# Patient Record
Sex: MALE | Race: BLACK OR AFRICAN AMERICAN | NOT HISPANIC OR LATINO | Employment: OTHER | URBAN - METROPOLITAN AREA
[De-identification: names, ages, dates, MRNs, and addresses within clinical notes are randomized per-mention and may not be internally consistent; named-entity substitution may affect disease eponyms.]

---

## 2024-03-14 ENCOUNTER — APPOINTMENT (OUTPATIENT)
Dept: RADIOLOGY | Facility: HOSPITAL | Age: 64
End: 2024-03-14
Payer: COMMERCIAL

## 2024-03-14 ENCOUNTER — HOSPITAL ENCOUNTER (EMERGENCY)
Facility: HOSPITAL | Age: 64
Discharge: HOME | End: 2024-03-14
Attending: EMERGENCY MEDICINE
Payer: COMMERCIAL

## 2024-03-14 VITALS
DIASTOLIC BLOOD PRESSURE: 114 MMHG | RESPIRATION RATE: 16 BRPM | SYSTOLIC BLOOD PRESSURE: 180 MMHG | HEART RATE: 72 BPM | OXYGEN SATURATION: 97 % | TEMPERATURE: 97.2 F

## 2024-03-14 DIAGNOSIS — I10 ESSENTIAL HYPERTENSION: Primary | ICD-10-CM

## 2024-03-14 DIAGNOSIS — J02.9 SORE THROAT: ICD-10-CM

## 2024-03-14 LAB
ALBUMIN SERPL BCP-MCNC: 4 G/DL (ref 3.4–5)
ALP SERPL-CCNC: 84 U/L (ref 33–136)
ALT SERPL W P-5'-P-CCNC: 13 U/L (ref 10–52)
ANION GAP SERPL CALC-SCNC: 11 MMOL/L (ref 10–20)
AST SERPL W P-5'-P-CCNC: 19 U/L (ref 9–39)
BASOPHILS # BLD AUTO: 0.04 X10*3/UL (ref 0–0.1)
BASOPHILS NFR BLD AUTO: 0.8 %
BILIRUB SERPL-MCNC: 0.7 MG/DL (ref 0–1.2)
BUN SERPL-MCNC: 14 MG/DL (ref 6–23)
CALCIUM SERPL-MCNC: 9.4 MG/DL (ref 8.6–10.6)
CARDIAC TROPONIN I PNL SERPL HS: 51 NG/L (ref 0–53)
CARDIAC TROPONIN I PNL SERPL HS: 63 NG/L (ref 0–53)
CHLORIDE SERPL-SCNC: 104 MMOL/L (ref 98–107)
CO2 SERPL-SCNC: 26 MMOL/L (ref 21–32)
CREAT SERPL-MCNC: 1.07 MG/DL (ref 0.5–1.3)
EGFRCR SERPLBLD CKD-EPI 2021: 78 ML/MIN/1.73M*2
EOSINOPHIL # BLD AUTO: 0.13 X10*3/UL (ref 0–0.7)
EOSINOPHIL NFR BLD AUTO: 2.7 %
ERYTHROCYTE [DISTWIDTH] IN BLOOD BY AUTOMATED COUNT: 11.5 % (ref 11.5–14.5)
GLUCOSE SERPL-MCNC: 109 MG/DL (ref 74–99)
HCT VFR BLD AUTO: 41.6 % (ref 41–52)
HGB BLD-MCNC: 14.8 G/DL (ref 13.5–17.5)
IMM GRANULOCYTES # BLD AUTO: 0.01 X10*3/UL (ref 0–0.7)
IMM GRANULOCYTES NFR BLD AUTO: 0.2 % (ref 0–0.9)
LYMPHOCYTES # BLD AUTO: 1.78 X10*3/UL (ref 1.2–4.8)
LYMPHOCYTES NFR BLD AUTO: 37.1 %
MAGNESIUM SERPL-MCNC: 1.94 MG/DL (ref 1.6–2.4)
MCH RBC QN AUTO: 30.8 PG (ref 26–34)
MCHC RBC AUTO-ENTMCNC: 35.6 G/DL (ref 32–36)
MCV RBC AUTO: 87 FL (ref 80–100)
MONOCYTES # BLD AUTO: 0.42 X10*3/UL (ref 0.1–1)
MONOCYTES NFR BLD AUTO: 8.8 %
NEUTROPHILS # BLD AUTO: 2.42 X10*3/UL (ref 1.2–7.7)
NEUTROPHILS NFR BLD AUTO: 50.4 %
NRBC BLD-RTO: 0 /100 WBCS (ref 0–0)
PLATELET # BLD AUTO: 182 X10*3/UL (ref 150–450)
POTASSIUM SERPL-SCNC: 4.3 MMOL/L (ref 3.5–5.3)
PROT SERPL-MCNC: 6.8 G/DL (ref 6.4–8.2)
RBC # BLD AUTO: 4.8 X10*6/UL (ref 4.5–5.9)
S PYO DNA THROAT QL NAA+PROBE: NOT DETECTED
SODIUM SERPL-SCNC: 137 MMOL/L (ref 136–145)
WBC # BLD AUTO: 4.8 X10*3/UL (ref 4.4–11.3)

## 2024-03-14 PROCEDURE — 84484 ASSAY OF TROPONIN QUANT: CPT | Performed by: STUDENT IN AN ORGANIZED HEALTH CARE EDUCATION/TRAINING PROGRAM

## 2024-03-14 PROCEDURE — 71045 X-RAY EXAM CHEST 1 VIEW: CPT | Performed by: STUDENT IN AN ORGANIZED HEALTH CARE EDUCATION/TRAINING PROGRAM

## 2024-03-14 PROCEDURE — 99285 EMERGENCY DEPT VISIT HI MDM: CPT | Performed by: EMERGENCY MEDICINE

## 2024-03-14 PROCEDURE — 87651 STREP A DNA AMP PROBE: CPT | Performed by: EMERGENCY MEDICINE

## 2024-03-14 PROCEDURE — 83735 ASSAY OF MAGNESIUM: CPT | Performed by: STUDENT IN AN ORGANIZED HEALTH CARE EDUCATION/TRAINING PROGRAM

## 2024-03-14 PROCEDURE — 36415 COLL VENOUS BLD VENIPUNCTURE: CPT | Performed by: STUDENT IN AN ORGANIZED HEALTH CARE EDUCATION/TRAINING PROGRAM

## 2024-03-14 PROCEDURE — 85025 COMPLETE CBC W/AUTO DIFF WBC: CPT | Performed by: STUDENT IN AN ORGANIZED HEALTH CARE EDUCATION/TRAINING PROGRAM

## 2024-03-14 PROCEDURE — 99283 EMERGENCY DEPT VISIT LOW MDM: CPT

## 2024-03-14 PROCEDURE — 80053 COMPREHEN METABOLIC PANEL: CPT | Performed by: STUDENT IN AN ORGANIZED HEALTH CARE EDUCATION/TRAINING PROGRAM

## 2024-03-14 PROCEDURE — 71045 X-RAY EXAM CHEST 1 VIEW: CPT

## 2024-03-14 ASSESSMENT — LIFESTYLE VARIABLES
HAVE PEOPLE ANNOYED YOU BY CRITICIZING YOUR DRINKING: NO
EVER FELT BAD OR GUILTY ABOUT YOUR DRINKING: NO
EVER HAD A DRINK FIRST THING IN THE MORNING TO STEADY YOUR NERVES TO GET RID OF A HANGOVER: NO
HAVE YOU EVER FELT YOU SHOULD CUT DOWN ON YOUR DRINKING: NO

## 2024-03-14 ASSESSMENT — COLUMBIA-SUICIDE SEVERITY RATING SCALE - C-SSRS
1. IN THE PAST MONTH, HAVE YOU WISHED YOU WERE DEAD OR WISHED YOU COULD GO TO SLEEP AND NOT WAKE UP?: NO
6. HAVE YOU EVER DONE ANYTHING, STARTED TO DO ANYTHING, OR PREPARED TO DO ANYTHING TO END YOUR LIFE?: NO
2. HAVE YOU ACTUALLY HAD ANY THOUGHTS OF KILLING YOURSELF?: NO

## 2024-03-14 ASSESSMENT — PAIN DESCRIPTION - PROGRESSION: CLINICAL_PROGRESSION: NOT CHANGED

## 2024-03-14 ASSESSMENT — PAIN - FUNCTIONAL ASSESSMENT: PAIN_FUNCTIONAL_ASSESSMENT: 0-10

## 2024-03-14 ASSESSMENT — PAIN SCALES - GENERAL: PAINLEVEL_OUTOF10: 0 - NO PAIN

## 2024-03-14 NOTE — DISCHARGE INSTRUCTIONS
You came to the emergency department with high blood pressure.  Please discuss with your primary care doctor about increasing your medications potentially to help control your blood pressure more.  High blood pressure can lead to worsening of the problems of the aorta or the arteries in your neck.  Please return to the ER if you have severe headache, you develop chest or severe abdominal pain, have numbness, tingling, weakness to the arms or legs or any new or concerning symptoms.    Follow-up your strep throat result in your chart.

## 2024-03-14 NOTE — ED TRIAGE NOTES
Pt presents to ED via EMS with c/c HTN/dizziness. Pt reports hx HTN, complaint with meds, AAA. Says while he was watching TV today he developed dizziness, feeling like he had to have a BM. Checked BP repeatedly with home cuff. Per EMS on their arrival BP 210s/140s. Pt took dose of Clonidine. Per EMS pt reports hx PTSD/anxiety, pt reported to EMS that he believes his 'crackhead neighbor' could be poisoning him.

## 2024-03-14 NOTE — ED PROVIDER NOTES
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  Patient states ever since his surgery he develops pressure in his lower abdomen about once daily when he has a bowel movement.  He states when he has a bowel movement, his blood pressure will usually spike, and he needs to take an as needed clonidine to break it down.  Reports normal blood pressures in the 150s systolic, but notes his blood pressure can spike as high as the 270s at home.  He usually becomes dizzy which he describes as room spinning with these episodes as well, but notes they resolve after his blood pressure.    Patient had an episode of symptoms today which she says was no different than his usual symptoms, however his blood pressure was too high to read on his home machine.  He did take a home clonidine.  Initial blood pressure too high to read for EMS, but was in the 210s on reevaluation.    Patient is very reluctant to have extensive workup in the emergency department, states he is seen very frequently.  He is adamant that he does not want a repeat CT scan of his chest abdomen pelvis as he does not like how the contrast feels, although he does recognize the risks of not performing repeat imaging.  He has no chest pain or abdominal pain currently, no dizziness currently.  States he is asymptomatic now.    Records Reviewed:  Recent available ED and inpatient notes reviewed in EMR.    PMHx/PSHx:  Per HPI.   - has a past medical history of Anxiety disorder, unspecified, Diverticulitis of intestine, part unspecified, without perforation or abscess without bleeding, Other allergy status, other than to drugs and biological substances, Other specified  health status, Personal history of other diseases of the circulatory system, and Personal history of other diseases of the circulatory system.  - has a past surgical history that includes Other surgical history (06/29/2020); Incision and drainage of wound (10/26/2016); CT angio abdomen pelvis w and or wo IV IV contrast (2/11/2018); CT angio abdomen pelvis w and or wo IV IV contrast (5/8/2018); CT angio abdomen pelvis w and or wo IV IV contrast (2/12/2019); CT angio abdomen pelvis w and or wo IV IV contrast (2/25/2019); CT angio neck (9/8/2020); CT angio abdomen pelvis w and or wo IV IV contrast (9/10/2020); CT angio abdomen pelvis w and or wo IV IV contrast (2/9/2022); CT angio abdomen pelvis w and or wo IV IV contrast (4/19/2017); CT angio abdomen pelvis w and or wo IV IV contrast (4/23/2017); CT angio abdomen pelvis w and or wo IV IV contrast (6/2/2017); CT angio abdomen pelvis w and or wo IV IV contrast (9/25/2017); CT angio abdomen pelvis w and or wo IV IV contrast (12/25/2017); CT angio abdomen pelvis w and or wo IV IV contrast (12/28/2017); CT angio abdomen pelvis w and or wo IV IV contrast (7/11/2018); CT angio coronary art with heartflow if score >30% (7/11/2018); CT angio head w and wo IV contrast (1/20/2023); CT angio neck (1/20/2023); CT angio abdomen pelvis w and or wo IV IV contrast (12/1/2022); and CT angio abdomen pelvis w and or wo IV IV contrast (12/4/2022).  - does not have a problem list on file.    Medications:  Reviewed in EMR. See EMR for complete list of medications and doses.    Allergies:  Ibuprofen and Carvedilol    Social History:  - Tobacco:  has no history on file for tobacco use.   - Alcohol:  has no history on file for alcohol use.   - Illicit Drugs:  has no history on file for drug use.     ROS:  Per HPI.       ???????????????????????????????????????????????????????????????  Triage Vitals:  T 36.2 °C (97.2 °F)  HR 88  BP (!) 208/128  RR 16  O2 99 %      Physical  Exam  Constitutional:       Appearance: Normal appearance.   HENT:      Head: Normocephalic and atraumatic.      Mouth/Throat:      Mouth: Mucous membranes are moist.   Eyes:      Conjunctiva/sclera: Conjunctivae normal.   Cardiovascular:      Rate and Rhythm: Normal rate and regular rhythm.      Heart sounds: Normal heart sounds.   Pulmonary:      Breath sounds: Normal breath sounds. No wheezing or rales.   Abdominal:      General: There is no distension.      Palpations: Abdomen is soft.      Tenderness: There is no abdominal tenderness.   Musculoskeletal:      Right lower leg: No edema.      Left lower leg: No edema.   Skin:     General: Skin is warm and dry.   Neurological:      General: No focal deficit present.      Mental Status: He is alert.   Psychiatric:         Mood and Affect: Mood normal.         Behavior: Behavior normal.       ???????????????????????????????????????????????????????????????  EKG:  3/14/2024 1448 -- NSR rate 82.  Normal axis.  Normal intervals.  Nonspecific T wave inversions in the inferior leads appears similar to prior EKG dated 8/25/2023.  No new ST-T changes or signs of acute ischemia.  Abnormal EKG.    Assessment and Plan:  63-year-old male with history as above presents to the emergency department with concern for elevated blood pressure, dizziness, and lower abdominal pain, now resolved.  Presentation is concerning for asymptomatic hypertension versus worsening of known aortic dissection flap or endoleak.  Blood pressure in the 210s on arrival, improved to 170s on reevaluation.  I had an extensive discussion with the patient at bedside about workup and recommendations.  He is agreeable to blood work and chest x-ray to rule out ACS or other acute cardiopulmonary etiologies, but is adamant that he does not want CT imaging.  In addition to his aforementioned aortic etiologies, he does have a history of vertebral artery dissection as well.  He describes periods of numbness over the  last few months, scattered and random in onset and duration, and all self-resolving.  Concerning for potential TIAs or worsening of vertebral artery dissection.    Patient understands the risks of these going untreated.  He has had many contrasted CT scans in the past, and states he does not want to pursue additional CT imaging at this time.  He understands the risk for undiagnosed TIAs or strokes, undiagnosed or worsening aortic or vertebral artery dissection, and other acute vascular pathology that cannot be ruled out without additional CT imaging.    Patient also expresses some paranoid thoughts.  States he does not trust his neighbor upstairs, and is worried that his neighbor may hurt him or his dog.  The patient does not perseverate on these ideas, however.  Does have a history of PTSD and anxiety, not currently on medication.  He states his nephew is going to get a security camera for his apartment.  I think this is reasonable.  Does not appear to have acute psychiatric decompensation.  Do not feel that he needs emergent psychiatric evaluation.    ED Course:  CXR without acute abnormality.  Cardiac workup demonstrates a mild troponin elevation which is around the patient's baseline, is down-trended on repeat.  EKG unchanged from prior.  Blood pressure continues to be improved to the 180s on reevaluation.  Patient continues to be asymptomatic.  Will be discharged home.  Patient tells me he will follow-up with his neurologist, primary care doctor, and cardiothoracic surgeon as outpatient.    Social Determinants Limiting Care:  None identified    Disposition:  Discharge home    --  Cristy Saavedra MD  Emergency Medicine, PGY-3      Diagnoses as of 03/14/24 1945   Essential hypertension   Sore throat     Procedures ? [employer] last updated 3/14/2024 2:39 PM         Cristy Saavedra MD  Resident  03/14/24 1952

## 2024-03-16 LAB
ATRIAL RATE: 82 BPM
P AXIS: 66 DEGREES
P OFFSET: 164 MS
P ONSET: 105 MS
PR INTERVAL: 208 MS
Q ONSET: 209 MS
QRS COUNT: 13 BEATS
QRS DURATION: 102 MS
QT INTERVAL: 350 MS
QTC CALCULATION(BAZETT): 408 MS
QTC FREDERICIA: 388 MS
R AXIS: 32 DEGREES
T AXIS: 15 DEGREES
T OFFSET: 384 MS
VENTRICULAR RATE: 82 BPM

## 2024-07-06 ENCOUNTER — HOSPITAL ENCOUNTER (EMERGENCY)
Facility: HOSPITAL | Age: 64
Discharge: HOME | End: 2024-07-06
Attending: STUDENT IN AN ORGANIZED HEALTH CARE EDUCATION/TRAINING PROGRAM
Payer: COMMERCIAL

## 2024-07-06 ENCOUNTER — APPOINTMENT (OUTPATIENT)
Dept: RADIOLOGY | Facility: HOSPITAL | Age: 64
End: 2024-07-06
Payer: COMMERCIAL

## 2024-07-06 ENCOUNTER — CLINICAL SUPPORT (OUTPATIENT)
Dept: EMERGENCY MEDICINE | Facility: HOSPITAL | Age: 64
End: 2024-07-06
Payer: COMMERCIAL

## 2024-07-06 VITALS
SYSTOLIC BLOOD PRESSURE: 192 MMHG | OXYGEN SATURATION: 96 % | HEART RATE: 73 BPM | DIASTOLIC BLOOD PRESSURE: 132 MMHG | WEIGHT: 211 LBS | TEMPERATURE: 98.4 F | BODY MASS INDEX: 28.58 KG/M2 | HEIGHT: 72 IN | RESPIRATION RATE: 16 BRPM

## 2024-07-06 DIAGNOSIS — R42 LIGHTHEADEDNESS: Primary | ICD-10-CM

## 2024-07-06 LAB
ALBUMIN SERPL BCP-MCNC: 3.9 G/DL (ref 3.4–5)
ALP SERPL-CCNC: 77 U/L (ref 33–136)
ALT SERPL W P-5'-P-CCNC: 12 U/L (ref 10–52)
ANION GAP SERPL CALC-SCNC: 11 MMOL/L (ref 10–20)
AST SERPL W P-5'-P-CCNC: 16 U/L (ref 9–39)
ATRIAL RATE: 75 BPM
BASOPHILS # BLD AUTO: 0.05 X10*3/UL (ref 0–0.1)
BASOPHILS NFR BLD AUTO: 1 %
BILIRUB SERPL-MCNC: 0.9 MG/DL (ref 0–1.2)
BUN SERPL-MCNC: 11 MG/DL (ref 6–23)
CALCIUM SERPL-MCNC: 9.1 MG/DL (ref 8.6–10.6)
CARDIAC TROPONIN I PNL SERPL HS: 64 NG/L (ref 0–53)
CARDIAC TROPONIN I PNL SERPL HS: 69 NG/L (ref 0–53)
CHLORIDE SERPL-SCNC: 105 MMOL/L (ref 98–107)
CO2 SERPL-SCNC: 26 MMOL/L (ref 21–32)
CREAT SERPL-MCNC: 0.94 MG/DL (ref 0.5–1.3)
EGFRCR SERPLBLD CKD-EPI 2021: >90 ML/MIN/1.73M*2
EOSINOPHIL # BLD AUTO: 0.16 X10*3/UL (ref 0–0.7)
EOSINOPHIL NFR BLD AUTO: 3.3 %
ERYTHROCYTE [DISTWIDTH] IN BLOOD BY AUTOMATED COUNT: 11.8 % (ref 11.5–14.5)
GLUCOSE SERPL-MCNC: 149 MG/DL (ref 74–99)
HCT VFR BLD AUTO: 39.4 % (ref 41–52)
HGB BLD-MCNC: 14.4 G/DL (ref 13.5–17.5)
IMM GRANULOCYTES # BLD AUTO: 0.01 X10*3/UL (ref 0–0.7)
IMM GRANULOCYTES NFR BLD AUTO: 0.2 % (ref 0–0.9)
LACTATE SERPL-SCNC: 1.7 MMOL/L (ref 0.4–2)
LYMPHOCYTES # BLD AUTO: 1.54 X10*3/UL (ref 1.2–4.8)
LYMPHOCYTES NFR BLD AUTO: 31.6 %
MCH RBC QN AUTO: 32.3 PG (ref 26–34)
MCHC RBC AUTO-ENTMCNC: 36.5 G/DL (ref 32–36)
MCV RBC AUTO: 88 FL (ref 80–100)
MONOCYTES # BLD AUTO: 0.41 X10*3/UL (ref 0.1–1)
MONOCYTES NFR BLD AUTO: 8.4 %
NEUTROPHILS # BLD AUTO: 2.71 X10*3/UL (ref 1.2–7.7)
NEUTROPHILS NFR BLD AUTO: 55.5 %
NRBC BLD-RTO: 0 /100 WBCS (ref 0–0)
P AXIS: 43 DEGREES
P OFFSET: 168 MS
P ONSET: 112 MS
PLATELET # BLD AUTO: 175 X10*3/UL (ref 150–450)
POTASSIUM SERPL-SCNC: 3.7 MMOL/L (ref 3.5–5.3)
PR INTERVAL: 224 MS
PROT SERPL-MCNC: 6.9 G/DL (ref 6.4–8.2)
Q ONSET: 224 MS
QRS COUNT: 13 BEATS
QRS DURATION: 88 MS
QT INTERVAL: 358 MS
QTC CALCULATION(BAZETT): 399 MS
QTC FREDERICIA: 385 MS
R AXIS: 19 DEGREES
RBC # BLD AUTO: 4.46 X10*6/UL (ref 4.5–5.9)
SODIUM SERPL-SCNC: 138 MMOL/L (ref 136–145)
T AXIS: 43 DEGREES
T OFFSET: 403 MS
VENTRICULAR RATE: 75 BPM
WBC # BLD AUTO: 4.9 X10*3/UL (ref 4.4–11.3)

## 2024-07-06 PROCEDURE — 84484 ASSAY OF TROPONIN QUANT: CPT

## 2024-07-06 PROCEDURE — 80053 COMPREHEN METABOLIC PANEL: CPT

## 2024-07-06 PROCEDURE — 93005 ELECTROCARDIOGRAM TRACING: CPT

## 2024-07-06 PROCEDURE — 36415 COLL VENOUS BLD VENIPUNCTURE: CPT

## 2024-07-06 PROCEDURE — 85025 COMPLETE CBC W/AUTO DIFF WBC: CPT

## 2024-07-06 PROCEDURE — 71046 X-RAY EXAM CHEST 2 VIEWS: CPT

## 2024-07-06 PROCEDURE — 71046 X-RAY EXAM CHEST 2 VIEWS: CPT | Mod: FOREIGN READ | Performed by: RADIOLOGY

## 2024-07-06 PROCEDURE — 83605 ASSAY OF LACTIC ACID: CPT

## 2024-07-06 PROCEDURE — 99283 EMERGENCY DEPT VISIT LOW MDM: CPT | Mod: 25

## 2024-07-06 RX ORDER — HYDROXYZINE HYDROCHLORIDE 25 MG/1
25 TABLET, FILM COATED ORAL EVERY 6 HOURS
Qty: 12 TABLET | Refills: 0 | Status: SHIPPED | OUTPATIENT
Start: 2024-07-06 | End: 2024-07-09

## 2024-07-06 RX ORDER — FAMOTIDINE 20 MG/1
20 TABLET, FILM COATED ORAL 2 TIMES DAILY
Qty: 30 TABLET | Refills: 0 | Status: SHIPPED | OUTPATIENT
Start: 2024-07-06 | End: 2024-07-21

## 2024-07-06 ASSESSMENT — LIFESTYLE VARIABLES
HAVE PEOPLE ANNOYED YOU BY CRITICIZING YOUR DRINKING: NO
EVER FELT BAD OR GUILTY ABOUT YOUR DRINKING: NO
TOTAL SCORE: 0
HAVE YOU EVER FELT YOU SHOULD CUT DOWN ON YOUR DRINKING: NO
EVER HAD A DRINK FIRST THING IN THE MORNING TO STEADY YOUR NERVES TO GET RID OF A HANGOVER: NO

## 2024-07-06 ASSESSMENT — COLUMBIA-SUICIDE SEVERITY RATING SCALE - C-SSRS
2. HAVE YOU ACTUALLY HAD ANY THOUGHTS OF KILLING YOURSELF?: NO
1. IN THE PAST MONTH, HAVE YOU WISHED YOU WERE DEAD OR WISHED YOU COULD GO TO SLEEP AND NOT WAKE UP?: NO
6. HAVE YOU EVER DONE ANYTHING, STARTED TO DO ANYTHING, OR PREPARED TO DO ANYTHING TO END YOUR LIFE?: NO

## 2024-07-06 NOTE — DISCHARGE INSTRUCTIONS
Please take your medications as prescribed.  If you develop worsening chest pain, passing out, severe back pain, shortness of breath, dizziness please return to the ED.  Please follow-up with your primary care physician to discuss your ED visit.

## 2024-07-06 NOTE — ED PROVIDER NOTES
CC: Dizziness     History provided by: Patient  Limitations to History: None    HPI:  Patient is a 63-year-old male with history of hypertension, type B aortic dissection in 2017 status post stenting with chronic complication of dissection flap and aneurysmal dilation who presents to the emergency department with elevated blood pressure and dizziness.  Patient states nonspinning dizziness began after he went to use the bathroom earlier today.  He states it was after he completed pain.  He denied any associated chest pain, shortness of breath.  He states he feels nauseous but it is improving, denies any episodes of vomiting.  He denies any abdominal pain.  He states he is anxious because he has a history of dissection.  He denies any vision changes, neck pain, mental status changes, slurred speech.  He states he took his home clonidine before arriving to ED and just took 100 mg of his 200 mg normal dose of labetalol.  I discussed extensively with patient possible need for CT scan and he is hesitant and currently declines CT imaging because of prior experiences.  He states in the past he could not pee for over a week after getting contrast and it makes him feel ill but denies any overt allergy.  He states he has gotten many contrast scans and does not want one at this time.    I reviewed discharge summary from August 2023 when patient was discharged from neurology service for dizziness and was diagnosed with left vertebral artery dissection secondary to uncontrolled hypertension and possible posterior circulation stroke.  At this time patient was started on aspirin.    External Records Reviewed:  I reviewed prior ED visits, Care Everywhere, discharge summaries and outpatient records as appropriate.   ???????????????????????????????????????????????????????????????  Triage Vitals:  T    HR 87  BP (!) 205/140  RR 20  O2 99 % None (Room air)    Physical Exam  Vitals and nursing note reviewed.   Constitutional:        General: He is not in acute distress.     Appearance: Normal appearance.   HENT:      Head: Normocephalic and atraumatic.   Eyes:      Conjunctiva/sclera: Conjunctivae normal.   Cardiovascular:      Rate and Rhythm: Normal rate and regular rhythm.      Pulses: Normal pulses.      Comments: No chest wall tenderness to palpation  Pulmonary:      Effort: Pulmonary effort is normal. No respiratory distress.      Breath sounds: Normal breath sounds.   Abdominal:      General: Abdomen is flat.      Palpations: Abdomen is soft.      Comments: No abdominal tenderness palpation, no palpable masses, no rebound, no guarding   Musculoskeletal:         General: Normal range of motion.      Cervical back: Normal range of motion and neck supple.   Skin:     General: Skin is warm and dry.   Neurological:      General: No focal deficit present.      Mental Status: He is alert and oriented to person, place, and time. Mental status is at baseline.      Comments: Normal finger-nose testing bilaterally, 2+ radial pulses and DP bilaterally, ambulatory with steady gait, cranial nerves II to XII grossly intact, alert and oriented x 3, NIH 0, VAN negative, no appreciable nystagmus, extraocular movements intact   Psychiatric:         Mood and Affect: Mood normal.         Behavior: Behavior normal.        ???????????????????????????????????????????????????????????????  ED Course/Treatment/Medical Decision Making  MDM:  Patient is a 63-year-old male who presents with dizziness.  Vital signs notable for hypertension otherwise, hemodynamically stable.  Differential includes orthostatic hypotension, symptomatic anemia, near syncope, TIA, seizure, arrhythmia, valvular abnormality, hypoglycemia, electrolyte abnormality, drug effect, infection, aortic dissection.  Patient has equal pulses in all extremities, no palpable abdominal mass, abdominal tenderness, tearing chest pain to his back therefore lower suspicion for overt dissection.  Will monitor  blood pressure and control heart rate and BP.  He has no focal neurologic deficits as well on my examination.  He notes improvement in lightheadedness since initial presentation.  Patient given Zofran as well.  EKG, chest x-ray and basic labs obtained.  Please see H&P for patient's refusal of CT scanning, he understands the risks of not obtaining CT pending including missing another dissection and other acute pathology which could lead to death or critical illness.  Point-of-care ultrasound done as well.      ED Course:  ED Course as of 07/06/24 1317   Sat Jul 06, 2024   0700 Ambulatory in ED with steady gait [SA]   0751 She reviewed sinus rhythm rate 75, prolonged ME interval of 224 ms, QRS 88 ms, QTc 399 ms, no acute ST segment elevations or depressions however new T wave inversion seen when compared to prior from March 2024 in leads V4, V5, V6 however has been seen on prior such as June 2023 [SA]   0900 Chest x-ray reviewed without acute cardiopulmonary abnormality, no cardiomegaly noted [SA]   0900 Initial troponin elevated at 69, patient denies chest pain, delta troponin obtained [SA]   0900 CBC, metabolic panel and lactate overall within normal limits [SA]   1100 Troponin 64 on repeat, stable.  Elevated troponin likely in the setting of chronic hypertension, patient continues to deny any chest pain [SA]   1204 BP improved [SA]   1317 Point-of-care ultrasound of aorta attempted, bowel gas impeding views however no obvious aortic abnormalities [SA]   1317 Patient would like to be discharged with famotidine and Atarax or hydroxyzine which was provided [SA]   1317 I once again discussed importance of CT scan for follow-up and patient is declining and understands the risks [SA]      ED Course User Index  [SA] Radha Coulter DO         Diagnoses as of 07/06/24 1317   Lightheadedness           EKG Interpretation:  See ED Course/Below:    Independent Interpretation of Studies:  I independently interpreted  labs/imaging as stated in ED Course or below.    Differential diagnoses considered include but are not limited to: See MDM/Below:    Social Determinants Limiting Care:  None identified The following actions were taken to address these social determinants: None    Discussion of Management with Other Providers: See MDM/Below:    Disposition:  Discussed differential and workup results including pertinent labs/imaging and any incidental findings if applicable. Patient will follow-up with the primary physician in the next 2-3 days. Return if worse. They understand return precautions and discharge instructions. Patient and family/friend/caregiver are in agreement with this plan.       DAVIS Mcclain, PGY-3    I reviewed the case with the attending ED physician. The attending ED physician agrees with the plan. Patient and/or patient´s representative was counseled regarding labs, imaging, likely diagnosis, and plan. All questions were answered.    Disclaimer: This note was dictated by speech recognition.  Attempt at proofreading was made to minimize errors.  Errors in transcription may be present.  Please call if questions.    Procedures ? [employer] last updated 7/6/2024 1:17 PM        Radha Coulter DO  Resident  07/06/24 0509

## 2024-07-06 NOTE — ED TRIAGE NOTES
"Patient presents via EMS. Per patient, he got up to go \"number one\" felt fine and went back to bed, then became light headed so he sat up and immediately became very dizzy. He states he then panicked and ran out of his room, got his cell phone, called [facility]-1-1 and took care of his dog. Pt attempted to lay back down the it made the dizziness worse. Upon arrival to the ER, patient was anxious and unable to sit down stating he needed to have a bowel movement but was afraid because he has had an abdominal dissection and always gets nervous when their is pressure in his stomach. Patient was escorted to the bathroom, had a bowel movement and stated he felt much better. Patient admits he has some PTSD and asked the bed rails stay down the room door stay open.   "

## 2024-10-16 ENCOUNTER — HOSPITAL ENCOUNTER (EMERGENCY)
Facility: HOSPITAL | Age: 64
Discharge: HOME | End: 2024-10-16
Attending: EMERGENCY MEDICINE
Payer: COMMERCIAL

## 2024-10-16 VITALS
DIASTOLIC BLOOD PRESSURE: 104 MMHG | SYSTOLIC BLOOD PRESSURE: 147 MMHG | OXYGEN SATURATION: 96 % | HEART RATE: 83 BPM | TEMPERATURE: 98.3 F | RESPIRATION RATE: 14 BRPM

## 2024-10-16 DIAGNOSIS — R79.89 ELEVATED TROPONIN: ICD-10-CM

## 2024-10-16 DIAGNOSIS — I10 HYPERTENSION, UNSPECIFIED TYPE: Primary | ICD-10-CM

## 2024-10-16 LAB
ALBUMIN SERPL BCP-MCNC: 4.1 G/DL (ref 3.4–5)
ALP SERPL-CCNC: 89 U/L (ref 33–136)
ALT SERPL W P-5'-P-CCNC: 12 U/L (ref 10–52)
ANION GAP SERPL CALC-SCNC: 12 MMOL/L (ref 10–20)
AST SERPL W P-5'-P-CCNC: 19 U/L (ref 9–39)
BASOPHILS # BLD AUTO: 0.04 X10*3/UL (ref 0–0.1)
BASOPHILS NFR BLD AUTO: 0.8 %
BILIRUB SERPL-MCNC: 0.5 MG/DL (ref 0–1.2)
BUN SERPL-MCNC: 15 MG/DL (ref 6–23)
CALCIUM SERPL-MCNC: 9.2 MG/DL (ref 8.6–10.6)
CARDIAC TROPONIN I PNL SERPL HS: 77 NG/L (ref 0–53)
CARDIAC TROPONIN I PNL SERPL HS: 88 NG/L (ref 0–53)
CHLORIDE SERPL-SCNC: 103 MMOL/L (ref 98–107)
CO2 SERPL-SCNC: 28 MMOL/L (ref 21–32)
CREAT SERPL-MCNC: 0.93 MG/DL (ref 0.5–1.3)
EGFRCR SERPLBLD CKD-EPI 2021: >90 ML/MIN/1.73M*2
EOSINOPHIL # BLD AUTO: 0.16 X10*3/UL (ref 0–0.7)
EOSINOPHIL NFR BLD AUTO: 3.1 %
ERYTHROCYTE [DISTWIDTH] IN BLOOD BY AUTOMATED COUNT: 11.7 % (ref 11.5–14.5)
GLUCOSE SERPL-MCNC: 140 MG/DL (ref 74–99)
HCT VFR BLD AUTO: 42.4 % (ref 41–52)
HGB BLD-MCNC: 14.5 G/DL (ref 13.5–17.5)
IMM GRANULOCYTES # BLD AUTO: 0.02 X10*3/UL (ref 0–0.7)
IMM GRANULOCYTES NFR BLD AUTO: 0.4 % (ref 0–0.9)
LYMPHOCYTES # BLD AUTO: 1.78 X10*3/UL (ref 1.2–4.8)
LYMPHOCYTES NFR BLD AUTO: 34.7 %
MCH RBC QN AUTO: 31.5 PG (ref 26–34)
MCHC RBC AUTO-ENTMCNC: 34.2 G/DL (ref 32–36)
MCV RBC AUTO: 92 FL (ref 80–100)
MONOCYTES # BLD AUTO: 0.48 X10*3/UL (ref 0.1–1)
MONOCYTES NFR BLD AUTO: 9.4 %
NEUTROPHILS # BLD AUTO: 2.65 X10*3/UL (ref 1.2–7.7)
NEUTROPHILS NFR BLD AUTO: 51.6 %
NRBC BLD-RTO: 0 /100 WBCS (ref 0–0)
PLATELET # BLD AUTO: 196 X10*3/UL (ref 150–450)
POTASSIUM SERPL-SCNC: 3.7 MMOL/L (ref 3.5–5.3)
PROT SERPL-MCNC: 6.9 G/DL (ref 6.4–8.2)
RBC # BLD AUTO: 4.6 X10*6/UL (ref 4.5–5.9)
SODIUM SERPL-SCNC: 139 MMOL/L (ref 136–145)
WBC # BLD AUTO: 5.1 X10*3/UL (ref 4.4–11.3)

## 2024-10-16 PROCEDURE — 85025 COMPLETE CBC W/AUTO DIFF WBC: CPT | Performed by: EMERGENCY MEDICINE

## 2024-10-16 PROCEDURE — 36415 COLL VENOUS BLD VENIPUNCTURE: CPT | Performed by: EMERGENCY MEDICINE

## 2024-10-16 PROCEDURE — 99283 EMERGENCY DEPT VISIT LOW MDM: CPT

## 2024-10-16 PROCEDURE — 80053 COMPREHEN METABOLIC PANEL: CPT | Performed by: EMERGENCY MEDICINE

## 2024-10-16 PROCEDURE — 99285 EMERGENCY DEPT VISIT HI MDM: CPT | Performed by: EMERGENCY MEDICINE

## 2024-10-16 PROCEDURE — 84484 ASSAY OF TROPONIN QUANT: CPT | Performed by: EMERGENCY MEDICINE

## 2024-10-16 SDOH — HEALTH STABILITY: MENTAL HEALTH: HAVE YOU WISHED YOU WERE DEAD OR WISHED YOU COULD GO TO SLEEP AND NOT WAKE UP?: NO

## 2024-10-16 SDOH — HEALTH STABILITY: MENTAL HEALTH: HAVE YOU EVER DONE ANYTHING, STARTED TO DO ANYTHING, OR PREPARED TO DO ANYTHING TO END YOUR LIFE?: NO

## 2024-10-16 SDOH — HEALTH STABILITY: MENTAL HEALTH: BEHAVIORAL HEALTH(WDL): WITHIN DEFINED LIMITS

## 2024-10-16 SDOH — HEALTH STABILITY: MENTAL HEALTH: HAVE YOU ACTUALLY HAD ANY THOUGHTS OF KILLING YOURSELF?: NO

## 2024-10-16 SDOH — HEALTH STABILITY: MENTAL HEALTH: SUICIDE ASSESSMENT: ADULT (C-SSRS)

## 2024-10-16 ASSESSMENT — COLUMBIA-SUICIDE SEVERITY RATING SCALE - C-SSRS
2. HAVE YOU ACTUALLY HAD ANY THOUGHTS OF KILLING YOURSELF?: NO
6. HAVE YOU EVER DONE ANYTHING, STARTED TO DO ANYTHING, OR PREPARED TO DO ANYTHING TO END YOUR LIFE?: NO
1. IN THE PAST MONTH, HAVE YOU WISHED YOU WERE DEAD OR WISHED YOU COULD GO TO SLEEP AND NOT WAKE UP?: NO

## 2024-10-16 ASSESSMENT — PAIN SCALES - GENERAL: PAINLEVEL_OUTOF10: 0 - NO PAIN

## 2024-10-16 ASSESSMENT — PAIN - FUNCTIONAL ASSESSMENT: PAIN_FUNCTIONAL_ASSESSMENT: 0-10

## 2024-10-16 NOTE — ED TRIAGE NOTES
"Pt bib ems for chief complaint of HTN. Pt stated that he took his blood pressure at home and called ems because his systolic was greater than 200 and his diastolic was over 100. Pt states he took his at home clonidine before he was picked up by ems. On arrival to the ED pt is in no apparent distress. Pt also endorses chest pain/indigestion and states that it is hard for him to tell the difference. Pt has a pmh of a dissection in 2016, HTN, PTSD, and diverticulitis. Pt states that he has had off/on dizziness for the past 6/7yrs after his dissection. Pt states he has \"slight\" dizziness, but he states that this has been his baseline. Pt denies any other complaints/concerns at this time.   "

## 2024-10-16 NOTE — DISCHARGE INSTRUCTIONS
You are seen today in the be hypertensive.  Her hypertension did improve.  However your troponin was elevated.  Did order offered admission but you wanted to follow-up with your cardiologist.  Please follow-up with your cardiologist in a few days.  Please return if chest pain worsens, shortness of breath, any new or concerning symptoms.

## 2024-10-16 NOTE — ED PROVIDER NOTES
History of Present Illness     History provided by: Patient  Limitations to History: None  External Records Reviewed with Brief Summary:  [facility] records for antihypertensive medications     HPI:  Sreekanth Greenfield is a 64 y.o. male with past medical history of a type a aortic dissection 7 years ago that was repaired who is presenting today with dizziness and hypertension.  Patient reports his blood pressure was elevated at home.  He reports that he then got in her wrist.  He reports that he took his clonidine prior to coming in.  He reports that he has been having some dizziness that is chronic.  He reports that is not worse today and is actually better than normal.  Patient reports that he has intermittently had chest pain but reports is better right now than it has been.  Reports no back pain.  Reports no abdominal tenderness reports no weakness or numbness.  Reports that these of all been going on since his dissection 7 years ago.  Reports that the only new complaint today is his blood pressure.  He also reports this is all since his potential posterior stroke which she has been evaluated by neurology for.  Reports that nothing is worse today.    Physical Exam   Triage vitals:  T    HR 86  BP (!) 189/130  RR 14  O2 99 % None (Room air)    General: Awake, alert, in no acute distress  Eyes: Gaze conjugate.  No scleral icterus or injection  HENT: Normo-cephalic, atraumatic. No stridor  CV: Regular rate, regular rhythm. Radial pulses 2+ bilaterally  Resp: Breathing non-labored, speaking in full sentences.  Clear to auscultation bilaterally  GI: Soft, non-distended, non-tender. No rebound or guarding.  MSK/Extremities: No gross bony deformities. Moving all extremities  Skin: Warm. Appropriate color  Neuro: Alert. Oriented. Face symmetric. Speech is fluent.  Gross strength and sensation intact in b/l UE and LEs  Psych: Appropriate mood and affect      Medical Decision Making & ED Course   Medical Decision Makin  y.o. male with a past medical history of diabetes aortic dissection who is presenting today with hypertension at home.  Patient also was reporting dizziness however this is chronic.  Reports no change in the dizziness is actually improved.  Patient is reporting improvement in his chest pain is actually better than his baseline.  On exam, patient's vitals were notable for hypertension.  Patient did note that he took clonidine prior to arrival.  Will repeat his blood pressure after improvement with clonidine.  Will get basic lab work including CBC, CMP, troponin to look for endorgan damage.  No headache or neurodeficits to indicate a CT of the head.  Patient's lab work was independently reviewed and showed a creatinine that was within normal limits.  CBC was unremarkable.  Patient did have an elevated troponin to 77 that was uptrending on the delta troponin.  Believe that this is likely in the setting of his hypertension however cannot exclude cardiac etiology.  Discussed the findings with the patient.  Did recommend admission for cardiac risk stratification.  Patient does have a cardiologist that he wishes to follow-up with.  He request that he be discharged at this time.  He does not want to stay for any additional risk stratification.  Patient was discharged and advised to follow-up with them.  Patient was given very strict return precautions to return if chest pain worsens, shortness of breath.  ----      Differential diagnoses considered include but are not limited to: hypertension, considered ACS.      Social Determinants of Health which Significantly Impact Care: None identified     EKG Independent Interpretation: EKG interpreted by myself. Please see ED Course and MDM for full interpretation.    Independent Result Review and Interpretation: Results were independently reviewed and interpreted by myself. Please see ED course and MDM for full interpretation.    Chronic conditions affecting the patient's care: As  documented in the [facility]    The patient was discussed with the following consultants/services: None    Care Considerations: As per [facility]    ED Course:  ED Course as of 10/16/24 0639   Wed Oct 16, 2024   0235 EKG was independently reviewed which showed sinus rhythm at a rate of 86.  UT interval is prolonged.  T wave inversions are noted which have been seen on prior EKGs from August 25 2023.  There were T wave inversions in lead III and aVF which are consistent with prior imaging today.  There was also T wave inversions in V6 [ROSELIA]   0404 64-year-old male with remote history of aortic dissection 7 years ago, medication resistant hypertension presents with hypertension.  Patient states he is on labetalol and clonidine and has been unable to tolerate multiple attempts at adding a third agent.  States that he takes his blood pressure multiple times daily and it is usually in the 150s over 100s.  Today it was 200/100 prompting ED eval.  Patient states in this context he had his chronic abdominal pain without any radiation to the neck or back.  Denies any new shortness of breath lower extremity pain or edema.  States this does not feel like his previous dissection pain.  On physical exam patient is now 150/110 and normal cardiac.  He is afebrile.  He has no JVD on exam.  He has clear breath sounds and normal heart sounds.  His abdomen is benign without any palpable mass.  He has intact radial femoral popliteal and dorsalis pedis pulses bilaterally.  He has a benign neuroexam.  Patient presents with asymptomatic accelerated hypertension despite medication compliance with labetalol and clonidine.  I have low suspicion for endoleak or new aortic pathology.  Patient has chronic chest pain that he states has not changed despite the fluctuations in his blood pressure.  We will get labs including a troponin as well as an x-ray.  Now that his blood pressure is back to its normal range we will not decrease it any further.  Patient has a  outpatient primary care physician who he can work with to escalate his antihypertensives.  Patient will be safe to discharge if our lab and imaging evaluation is benign [CM]   0629 Discussed admission for cardiac risk stratification. Patient declined and would follow up with their cardiologist.  [ROSELIA]      ED Course User Index  [ROSELIA] Laila Keys MD  [CM] Jay Jay Worthington MD         Diagnoses as of 10/16/24 0639   Hypertension, unspecified type   Elevated troponin     Disposition   As a result of the work-up, the patient was discharged home.  he was informed of his diagnosis and instructed to come back with any concerns or worsening of condition.  he and was agreeable to the plan as discussed above.  he was given the opportunity to ask questions.  All of the patient's questions were answered.    Procedures   Procedures    Patient seen and discussed with ED attending physician.    Laila Keys MD  Emergency Medicine     Laila Keys MD  Resident  10/16/24 0670

## 2025-01-31 ENCOUNTER — APPOINTMENT (OUTPATIENT)
Dept: RADIOLOGY | Facility: HOSPITAL | Age: 65
End: 2025-01-31
Payer: COMMERCIAL

## 2025-01-31 ENCOUNTER — APPOINTMENT (OUTPATIENT)
Dept: VASCULAR MEDICINE | Facility: HOSPITAL | Age: 65
End: 2025-01-31
Payer: COMMERCIAL

## 2025-01-31 ENCOUNTER — HOSPITAL ENCOUNTER (EMERGENCY)
Facility: HOSPITAL | Age: 65
Discharge: HOME | End: 2025-01-31
Attending: STUDENT IN AN ORGANIZED HEALTH CARE EDUCATION/TRAINING PROGRAM
Payer: COMMERCIAL

## 2025-01-31 ENCOUNTER — CLINICAL SUPPORT (OUTPATIENT)
Dept: EMERGENCY MEDICINE | Facility: HOSPITAL | Age: 65
End: 2025-01-31
Payer: COMMERCIAL

## 2025-01-31 VITALS
WEIGHT: 202 LBS | RESPIRATION RATE: 14 BRPM | SYSTOLIC BLOOD PRESSURE: 184 MMHG | OXYGEN SATURATION: 94 % | TEMPERATURE: 97.9 F | BODY MASS INDEX: 27.36 KG/M2 | HEART RATE: 81 BPM | HEIGHT: 72 IN | DIASTOLIC BLOOD PRESSURE: 116 MMHG

## 2025-01-31 DIAGNOSIS — M62.838 MUSCLE SPASM: Primary | ICD-10-CM

## 2025-01-31 DIAGNOSIS — I10 HYPERTENSION, UNSPECIFIED TYPE: ICD-10-CM

## 2025-01-31 DIAGNOSIS — I77.74 VERTEBRAL ARTERY DISSECTION (MULTI): ICD-10-CM

## 2025-01-31 LAB
ANION GAP SERPL CALC-SCNC: 14 MMOL/L (ref 10–20)
ATRIAL RATE: 84 BPM
BASOPHILS # BLD AUTO: 0.04 X10*3/UL (ref 0–0.1)
BASOPHILS NFR BLD AUTO: 0.5 %
BUN SERPL-MCNC: 13 MG/DL (ref 6–23)
CALCIUM SERPL-MCNC: 9.2 MG/DL (ref 8.6–10.6)
CARDIAC TROPONIN I PNL SERPL HS: 50 NG/L (ref 0–53)
CARDIAC TROPONIN I PNL SERPL HS: 53 NG/L (ref 0–53)
CHLORIDE SERPL-SCNC: 100 MMOL/L (ref 98–107)
CO2 SERPL-SCNC: 26 MMOL/L (ref 21–32)
CREAT SERPL-MCNC: 0.98 MG/DL (ref 0.5–1.3)
EGFRCR SERPLBLD CKD-EPI 2021: 87 ML/MIN/1.73M*2
EOSINOPHIL # BLD AUTO: 0.11 X10*3/UL (ref 0–0.7)
EOSINOPHIL NFR BLD AUTO: 1.3 %
ERYTHROCYTE [DISTWIDTH] IN BLOOD BY AUTOMATED COUNT: 11.1 % (ref 11.5–14.5)
GLUCOSE SERPL-MCNC: 125 MG/DL (ref 74–99)
HCT VFR BLD AUTO: 38.5 % (ref 41–52)
HGB BLD-MCNC: 14.1 G/DL (ref 13.5–17.5)
IMM GRANULOCYTES # BLD AUTO: 0.03 X10*3/UL (ref 0–0.7)
IMM GRANULOCYTES NFR BLD AUTO: 0.3 % (ref 0–0.9)
LYMPHOCYTES # BLD AUTO: 1.74 X10*3/UL (ref 1.2–4.8)
LYMPHOCYTES NFR BLD AUTO: 20.1 %
MCH RBC QN AUTO: 32 PG (ref 26–34)
MCHC RBC AUTO-ENTMCNC: 36.6 G/DL (ref 32–36)
MCV RBC AUTO: 88 FL (ref 80–100)
MONOCYTES # BLD AUTO: 0.63 X10*3/UL (ref 0.1–1)
MONOCYTES NFR BLD AUTO: 7.3 %
NEUTROPHILS # BLD AUTO: 6.09 X10*3/UL (ref 1.2–7.7)
NEUTROPHILS NFR BLD AUTO: 70.5 %
NRBC BLD-RTO: 0 /100 WBCS (ref 0–0)
P AXIS: 56 DEGREES
P OFFSET: 177 MS
P ONSET: 116 MS
PLATELET # BLD AUTO: 210 X10*3/UL (ref 150–450)
POTASSIUM SERPL-SCNC: 3.9 MMOL/L (ref 3.5–5.3)
PR INTERVAL: 208 MS
Q ONSET: 220 MS
QRS COUNT: 13 BEATS
QRS DURATION: 108 MS
QT INTERVAL: 360 MS
QTC CALCULATION(BAZETT): 425 MS
QTC FREDERICIA: 402 MS
R AXIS: 39 DEGREES
RBC # BLD AUTO: 4.4 X10*6/UL (ref 4.5–5.9)
SODIUM SERPL-SCNC: 136 MMOL/L (ref 136–145)
T AXIS: -11 DEGREES
T OFFSET: 400 MS
VENTRICULAR RATE: 84 BPM
WBC # BLD AUTO: 8.6 X10*3/UL (ref 4.4–11.3)

## 2025-01-31 PROCEDURE — 93005 ELECTROCARDIOGRAM TRACING: CPT

## 2025-01-31 PROCEDURE — 85025 COMPLETE CBC W/AUTO DIFF WBC: CPT

## 2025-01-31 PROCEDURE — 84484 ASSAY OF TROPONIN QUANT: CPT

## 2025-01-31 PROCEDURE — 80048 BASIC METABOLIC PNL TOTAL CA: CPT

## 2025-01-31 PROCEDURE — 70498 CT ANGIOGRAPHY NECK: CPT | Performed by: RADIOLOGY

## 2025-01-31 PROCEDURE — 71275 CT ANGIOGRAPHY CHEST: CPT | Performed by: RADIOLOGY

## 2025-01-31 PROCEDURE — 70498 CT ANGIOGRAPHY NECK: CPT

## 2025-01-31 PROCEDURE — 2500000005 HC RX 250 GENERAL PHARMACY W/O HCPCS

## 2025-01-31 PROCEDURE — 36415 COLL VENOUS BLD VENIPUNCTURE: CPT

## 2025-01-31 PROCEDURE — 74174 CTA ABD&PLVS W/CONTRAST: CPT | Performed by: RADIOLOGY

## 2025-01-31 PROCEDURE — 2550000001 HC RX 255 CONTRASTS: Performed by: STUDENT IN AN ORGANIZED HEALTH CARE EDUCATION/TRAINING PROGRAM

## 2025-01-31 PROCEDURE — 99285 EMERGENCY DEPT VISIT HI MDM: CPT | Performed by: STUDENT IN AN ORGANIZED HEALTH CARE EDUCATION/TRAINING PROGRAM

## 2025-01-31 PROCEDURE — 71275 CT ANGIOGRAPHY CHEST: CPT

## 2025-01-31 PROCEDURE — 99285 EMERGENCY DEPT VISIT HI MDM: CPT | Mod: 25 | Performed by: STUDENT IN AN ORGANIZED HEALTH CARE EDUCATION/TRAINING PROGRAM

## 2025-01-31 PROCEDURE — 93010 ELECTROCARDIOGRAM REPORT: CPT | Performed by: STUDENT IN AN ORGANIZED HEALTH CARE EDUCATION/TRAINING PROGRAM

## 2025-01-31 PROCEDURE — 2500000004 HC RX 250 GENERAL PHARMACY W/ HCPCS (ALT 636 FOR OP/ED)

## 2025-01-31 RX ORDER — LIDOCAINE 50 MG/G
1 PATCH TOPICAL DAILY
Qty: 5 PATCH | Refills: 0 | Status: ON HOLD | OUTPATIENT
Start: 2025-01-31

## 2025-01-31 RX ORDER — LIDOCAINE 560 MG/1
1 PATCH PERCUTANEOUS; TOPICAL; TRANSDERMAL DAILY
Status: DISCONTINUED | OUTPATIENT
Start: 2025-01-31 | End: 2025-01-31

## 2025-01-31 RX ORDER — LIDOCAINE 560 MG/1
1 PATCH PERCUTANEOUS; TOPICAL; TRANSDERMAL DAILY
Status: DISCONTINUED | OUTPATIENT
Start: 2025-01-31 | End: 2025-01-31 | Stop reason: SDUPTHER

## 2025-01-31 RX ORDER — MORPHINE SULFATE 4 MG/ML
4 INJECTION INTRAVENOUS ONCE
Status: DISCONTINUED | OUTPATIENT
Start: 2025-01-31 | End: 2025-01-31

## 2025-01-31 RX ORDER — LIDOCAINE 560 MG/1
1 PATCH PERCUTANEOUS; TOPICAL; TRANSDERMAL DAILY
Status: DISCONTINUED | OUTPATIENT
Start: 2025-01-31 | End: 2025-01-31 | Stop reason: HOSPADM

## 2025-01-31 RX ORDER — METHOCARBAMOL 100 MG/ML
1000 INJECTION, SOLUTION INTRAMUSCULAR; INTRAVENOUS ONCE
Status: DISCONTINUED | OUTPATIENT
Start: 2025-01-31 | End: 2025-01-31

## 2025-01-31 RX ORDER — CYCLOBENZAPRINE HCL 10 MG
10 TABLET ORAL 2 TIMES DAILY PRN
Qty: 10 TABLET | Refills: 0 | Status: ON HOLD | OUTPATIENT
Start: 2025-01-31 | End: 2025-02-05

## 2025-01-31 RX ORDER — ACETAMINOPHEN 500 MG
1000 TABLET ORAL EVERY 6 HOURS PRN
Qty: 30 TABLET | Refills: 0 | Status: ON HOLD | OUTPATIENT
Start: 2025-01-31 | End: 2025-02-10

## 2025-01-31 RX ADMIN — IOHEXOL 90 ML: 350 INJECTION, SOLUTION INTRAVENOUS at 05:16

## 2025-01-31 RX ADMIN — LIDOCAINE 1 PATCH: 560 PATCH PERCUTANEOUS; TOPICAL; TRANSDERMAL at 08:06

## 2025-01-31 ASSESSMENT — PAIN - FUNCTIONAL ASSESSMENT: PAIN_FUNCTIONAL_ASSESSMENT: 0-10

## 2025-01-31 ASSESSMENT — PAIN SCALES - GENERAL
PAINLEVEL_OUTOF10: 0 - NO PAIN
PAINLEVEL_OUTOF10: 6

## 2025-01-31 ASSESSMENT — PAIN DESCRIPTION - LOCATION: LOCATION: NECK

## 2025-01-31 NOTE — DISCHARGE INSTRUCTIONS
You were found to have elevated blood pressures during your ED visit. Please follow up with your primary care physician in 1-2 weeks for blood pressure check. If you do not have a primary care doctor you may call [phone] to make an appointment.    As discussed, you have a history of an aneurysm and a dissection and high blood pressure reports you had significant risks for these to worsen.  If you start having worsening chest pain, shortness of breath, numbness tingling, back pain please return to the emergency department as soon as possible.

## 2025-01-31 NOTE — ED TRIAGE NOTES
Pt BIB EMS from home. Pt reports having muscle spasms in his neck. Pt reports pains are worse when laying down. Pt has a hx of HTN. Pt hypertensive at this time.

## 2025-01-31 NOTE — ED PROVIDER NOTES
CC: Spasms     History provided by: Patient  Limitations to History: None    HPI:    Patient is a 64-year-old male with a PMH of type B aortic dissection s/p TEVAR in 2017, hypertension, and PTSD who presents to the emergency department for chief complaint of bilateral neck spasms.  Patient reports that for approximately 3 days he has had severe neck pain with associated spasms.  He reports that this initially started on the left side of his neck but is currently on the right side of his neck.  He reports that the pain is intermittent in nature and described as a sharp with muscle spasms.  He reports that when he has these events he feels sick to which this is described as nausea and intermittent blurry vision.  Additionally the patient has had right sided upper thoracic back pain.  He reports that he has chronic back pain but his pain has worsened within the last couple days.  The patient denies any traumatic event or injury to his head/neck.  He denies chest pain, headache, syncope, shortness of breath, fevers, chills, extremity paresthesias, or extremity weakness.  He reports that he is supposed to follow-up with vascular surgery and cardiology but he has been lost to follow-up.    External Records Reviewed: Previous ED records, inpatient records, and outpatient records  ???????????????????????????????????????????????????????????????  Triage Vitals:  T 36.6 °C (97.9 °F)  HR 82  BP (!) 188/132  RR 14  O2 96 % None (Room air)    Physical Exam  Constitutional:       General: He is awake. He is not in acute distress.     Appearance: He is not ill-appearing, toxic-appearing or diaphoretic.   HENT:      Head: Normocephalic and atraumatic.   Neck:      Comments: There is decreased active range of motion of the neck in all planes due to pain.  There is bilateral SCM hypertonicity.  Cardiovascular:      Rate and Rhythm: Normal rate and regular rhythm.      Pulses:           Radial pulses are 2+ on the right side and  2+ on the left side.        Dorsalis pedis pulses are 2+ on the right side and 2+ on the left side.      Heart sounds: Normal heart sounds, S1 normal and S2 normal. No murmur heard.     No friction rub.   Pulmonary:      Effort: Pulmonary effort is normal. No respiratory distress.      Breath sounds: Normal breath sounds.      Comments: Lungs are clear to auscultation without evidence of wheezing, crackles, or rhonchi  Abdominal:      General: Abdomen is flat. There is no distension.      Palpations: Abdomen is soft.      Tenderness: There is no abdominal tenderness. There is no guarding or rebound.   Musculoskeletal:      Right lower leg: No edema.      Left lower leg: No edema.   Skin:     General: Skin is warm and dry.      Capillary Refill: Capillary refill takes less than 2 seconds.   Neurological:      General: No focal deficit present.      Mental Status: He is alert and oriented to person, place, and time.      Comments: Neurologic: Patient is awake and alert. Speech is clear. Face is symmetric without facial droop and facial sensation to light touch equal bilaterally. Uvula midline. Tongue protrusion midline. Hearing intact bilaterally. Full and equal shoulder shrug and head turn against resistance. 5/5 motor strength of UEs and LEs. Sensation to light touch intact in all four extremities. Finger to nose and heel to shin intact. No pronator drift. No gait abnormalities.    Psychiatric:         Behavior: Behavior is cooperative.        ???????????????????????????????????????????????????????????????  ED Course/Treatment/Medical Decision Making    EKG Interpretation:   Normal sinus rhythm. Rate of 84 bpm. Normal axis. Normal intervals. No acute ST elevations, depressions.  There are new T wave inversions in 3 and aVF.  This could be related to lead placement given the QRS morphology when compared to previous EKG.     Independent Interpretation of Studies:  I independently interpreted: EKG    Differential  diagnoses considered include but ar not limited to: Aortic dissection, aortic aneurysm, torticollis, neck spasm, carotid artery dissection, intracranial hemorrhage, vertebral artery dissection    Social Determinants Limiting Care:  None identified         ED Course:  ED Course as of 02/01/25 1622   Fri Jan 31, 2025   0348 Emergency Medicine Attending Attestation:     [unfilled]    The patient was seen by the resident/fellow.  I have personally performed a substantive portion of the encounter.  I have seen and examined the patient; agree with the workup, evaluation, MDM, management and diagnosis.  The care plan has been discussed with the resident; I have reviewed the resident's note and agree with the documented findings.      Patient is a 64-year-old male with a past medical history of type a aortic dissection (s/p repair) who presents the emergency department for a few days of bilateral neck pain, now only on the right side of the neck.  Patient also reports some right sided thoracic/posterior abdominal pain.  He states the pain is slightly worse than usual but he thinks it may be due to a bad mattress.  No chest pain, no shortness of breath, no nausea/vomiting, no fever, no headache.  On my exam patient is well-appearing, alert and orient x 4, CN II-XII intact, he moves all 4 extremity spontaneously with sensation intact light touch.  He has a narrow and stable gait.  Patient does have tenderness to palpation along the right trapezius muscle where it meets the occiput.  He has full ROM of his neck, able to touch his chin to his chest.  He does not have tenderness to the left side of his neck.  No overlying skin changes.  Patient has a soft and benign abdominal exam.  I suspect musculoskeletal injury to patient's neck pain given the pain along his right trapezius muscle, isolated to one side without any additional symptoms to suggest infection or possible subarachnoid hemorrhage.  Patient has full ROM of the neck.   Given his thoracic back pain, we will plan to evaluate his aorta as well and reassess prior to final disposition.    I independently interpreted patient's EKG and agree with the above mentioned interpretation.      Tuan Jerez MD   [AM]   4132 64-year-old male with a history of type a dissection, hypertension comes in today for neck spasms.  Patient reports he is never had neck spasms in the past like this.  They began in his left neck and now are's very severe in his right side of his neck.  He is very reticent to take any medications that she states they do not agree with him.  I have offered him Valium for muscle relaxation which she has refused.  I have also offered Dilaudid.  Patient's wet read on CTA shows a stable aortic type a dissection repair with no endoleak and an otherwise normal CT chest.  Given the location of the patient's pain, and despite the absence of neurologic symptoms I am concerned given his propensity for dissections that he may have a carotid artery dissection causing his pain.  Given this, we have ordered a CT angio of the neck however CT angio of the chest was performed at 5 AM and radiology will not perform 1 within 24 hours.  For this reason we will do a CT Doppler carotid to evaluate for any dissection flap.  Regarding his blood pressure which is currently 200/130, patient did take his home dose of clonidine and labetalol and we will await for this to come down.  I have offered him and explained to him the importance of bringing his blood pressure down but he continues to refuse any continued or additional blood pressure medications at this time.  He was given a lidocaine patch and to hot packs for his neck spasms.  We will attempt to evaluate for carotid artery dissection given the limitations of contrast administration and patient's willingness to take blood pressure and pain medication.  Neurologic exam currently shows no deficits. [JY]   0941 Creatinine: 0.98 [JY]   8791  CT angio chest abdomen pelvis  1. Stable postsurgical changes of type a aortic dissection repair and no acute aortic pathology. Evaluation for endoleak is limited due to  lack of delayed imaging. In the setting of reported bilateral neck pain, there is no obvious dissection flap propagation into the visualized portions of the proximal bilateral common carotid arteries  or visualized portions of the subclavian arteries.  2.  No acute abnormality of the chest, abdomen or pelvis.  3. Prostatomegaly. Recommend correlation with serum PSA. Additional chronic and incidental findings as above.   [AW]      ED Course User Index  [AM] Tuan Jerez MD  [AW] Cheri Membreno DO  [JY] Delon Montesinos DO         Diagnoses as of 02/01/25 1622   Muscle spasm   Hypertension, unspecified type   Vertebral artery dissection (Multi)       MDM:    Patient is a 64-year-old male with above PMH who presents to the emergency department for chief complaint of neck spasms.  Upon arrival patient's vital signs are remarkable for hypertension, he is nontoxic-appearing, and appears in no acute distress.  Upon examination the patient has no focal neurological deficits.  The patient's neck pain today does not appear to be musculoskeletal in nature.  Given patient's thoracic back pain and history of aortic dissection we will obtain a CTA to evaluate for endoleak or other abnormalities.  Although carotid and vertebral artery pathology is in the differential but less likely given no focal neurological deficits or history of trauma.  The patient's neck pain will be treated with morphine and Robaxin.  We will reevaluate the patient's neck pain after medication administration.  If the patient continues to have persistent neck pain we will consider a CTA of the neck.  EKG is nonischemic as dictated above and troponin is within normal limits x 2.  I was notified by nurse that the patient has refused medication administration for his neck pain as he is  scared of medications.  Patient further declined Tylenol or ibuprofen at this time.  The patient was signed out to the oncoming ED resident in stable condition pending reevaluation and CT of the chest abdomen pelvis final reads.    Impression:  Signed out to oncoming ED resident    Disposition:  Signed out to oncoming ED resident    Mendez Gonsalez, DO   Emergency Medicine, PGY-2      Procedures ? Smart[employer] last updated 1/31/2025 2:13 AM          Mendez Gonsalez DO  Resident  02/01/25 4775

## 2025-01-31 NOTE — PROGRESS NOTES
Emergency Department Transition of Care Note       Signout   I received Sreekanth Greenfield in signout from Dr. Gonsalez.  Please see the ED Provider Note for all HPI, PE and MDM up to the time of signout at 0700.  This is in addition to the primary record.    In brief Sreekanth Greenfield is an 64 y.o. male presenting for intermittent bilateral neck pain and mid thoracic pain with history of a type a aortic dissection s/p repair.     At the time of signout we were awaiting:  CT angio and clinical improvement    ED Course & Medical Decision Making   Medical Decision Making:  Under my care, I reevaluated the patient.  He was found in his room to be pacing and clutching the right side of his neck obviously uncomfortable.  I again asked him if we be able to provide him with some medication.  I did offer him a lidocaine patch.  Initially he declined stating he is so afraid of all medication.  I offered him a Tylenol, he states I cannot do that I think I am allergic.  We discussed other alternatives including muscle relaxants however ultimately patient decided on a lidocaine patch which he did have significant improvement of his pain with.    On my reevaluation, patient still notably hypertensive.  Patient states that he is already taken his home blood pressure medications.  We discussed in length his history of aortic dissection and possibly vertebral artery dissection and the importance of blood pressure control.  Patient adamantly declines blood pressure medication, stating that makes him nauseous.  Vascular surgery was consulted after CT angio of the neck showed concern for a left-sided vertebral artery dissection.  They recommended admission to the ICU given his hypertension and multiple risk factors for IV antihypertensive.  They discussed this with the past patient at bedside as did I and he adamantly declines blood pressure control as it makes him nauseous and lowers his quality of life.  At this time patient has  decision-making capacity, the risk of severe hypertension such as he is exhibiting could cause life-threatening injuries and even death.  Patient is comfortable taking this risk at this time.  Vascular surgery will set up follow-up appointment with him.  I recommended he follow-up with his primary care provider for blood pressure recheck and likely additional blood pressure agents.  He was discharged with strict return precautions with conservative management of his neck pain      ED Course:  ED Course as of 01/31/25 1456   Fri Jan 31, 2025   0349 Emergency Medicine Attending Attestation:     [unfilled]    The patient was seen by the resident/fellow.  I have personally performed a substantive portion of the encounter.  I have seen and examined the patient; agree with the workup, evaluation, MDM, management and diagnosis.  The care plan has been discussed with the resident; I have reviewed the resident's note and agree with the documented findings.      Patient is a 64-year-old male with a past medical history of type a aortic dissection (s/p repair) who presents the emergency department for a few days of bilateral neck pain, now only on the right side of the neck.  Patient also reports some right sided thoracic/posterior abdominal pain.  He states the pain is slightly worse than usual but he thinks it may be due to a bad mattress.  No chest pain, no shortness of breath, no nausea/vomiting, no fever, no headache.  On my exam patient is well-appearing, alert and orient x 4, CN II-XII intact, he moves all 4 extremity spontaneously with sensation intact light touch.  He has a narrow and stable gait.  Patient does have tenderness to palpation along the right trapezius muscle where it meets the occiput.  He has full ROM of his neck, able to touch his chin to his chest.  He does not have tenderness to the left side of his neck.  No overlying skin changes.  Patient has a soft and benign abdominal exam.  I suspect  musculoskeletal injury to patient's neck pain given the pain along his right trapezius muscle, isolated to one side without any additional symptoms to suggest infection or possible subarachnoid hemorrhage.  Patient has full ROM of the neck.  Given his thoracic back pain, we will plan to evaluate his aorta as well and reassess prior to final disposition.    I independently interpreted patient's EKG and agree with the above mentioned interpretation.      Tuan Jerez MD   [AM]   2683 64-year-old male with a history of type a dissection, hypertension comes in today for neck spasms.  Patient reports he is never had neck spasms in the past like this.  They began in his left neck and now are's very severe in his right side of his neck.  He is very reticent to take any medications that she states they do not agree with him.  I have offered him Valium for muscle relaxation which she has refused.  I have also offered Dilaudid.  Patient's wet read on CTA shows a stable aortic type a dissection repair with no endoleak and an otherwise normal CT chest.  Given the location of the patient's pain, and despite the absence of neurologic symptoms I am concerned given his propensity for dissections that he may have a carotid artery dissection causing his pain.  Given this, we have ordered a CT angio of the neck however CT angio of the chest was performed at 5 AM and radiology will not perform 1 within 24 hours.  For this reason we will do a CT Doppler carotid to evaluate for any dissection flap.  Regarding his blood pressure which is currently 200/130, patient did take his home dose of clonidine and labetalol and we will await for this to come down.  I have offered him and explained to him the importance of bringing his blood pressure down but he continues to refuse any continued or additional blood pressure medications at this time.  He was given a lidocaine patch and to hot packs for his neck spasms.  We will attempt to  evaluate for carotid artery dissection given the limitations of contrast administration and patient's willingness to take blood pressure and pain medication.  Neurologic exam currently shows no deficits. [JY]   0941 Creatinine: 0.98 [JY]   1335 CT angio chest abdomen pelvis  1. Stable postsurgical changes of type a aortic dissection repair and no acute aortic pathology. Evaluation for endoleak is limited due to  lack of delayed imaging. In the setting of reported bilateral neck pain, there is no obvious dissection flap propagation into the visualized portions of the proximal bilateral common carotid arteries  or visualized portions of the subclavian arteries.  2.  No acute abnormality of the chest, abdomen or pelvis.  3. Prostatomegaly. Recommend correlation with serum PSA. Additional chronic and incidental findings as above.   [AW]      ED Course User Index  [AM] Tuan Jerez MD  [AW] Cheri Membreno DO  [JY] Delon Montesinos DO         Diagnoses as of 01/31/25 1456   Muscle spasm   Hypertension, unspecified type   Vertebral artery dissection (Multi)       Disposition   As a result of the work-up, the patient was discharged home.  he was informed of his diagnosis and instructed to come back with any concerns or worsening of condition.  he and was agreeable to the plan as discussed above.  he was given the opportunity to ask questions.  All of the patient's questions were answered.    Procedures   Procedures    Patient seen and discussed with ED attending physician.    Cheri Membreno DO  Emergency Medicine

## 2025-01-31 NOTE — CONSULTS
"  VASCULAR SURGERY CONSULT NOTE  HPI:  Sreekanth Greenfield is a 64 y.o. male who has a history of TBAD s/p TEVAR with CSB in 2017. He was following with vascular surgery until 2021, when issues in his personal life caused him to be lost to follow up.  He comes in with complaint of neck pain. This started in his left neck and moved to his right neck. He describes this as \"crampy, shooting\" pain that is worse when he moves his neck. Vascular surgery has been consulted due to a finding of a dissection in the V2 segment of the left vertebral artery. The patient has not known about this previously. He is hypertensive in the emergency room with pressures as high as 210-220 mmHg; of note he has been refusing antihypertensive medications because they make him nauseous. When discussing the potential risks of hypertension including stroke, rupture of his aortic aneurysm, and death, he explains that the nausea is so intense that it interferes with his quality of life, and he maintains his refusal of medications to treat this.   He has chronic back pain at home that comes and goes. 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 He does not currently have back pain despite a systolic pressure of 212 mmHg during interview.     PMH:  HTN, TBAD, PTSD     PSH:   TEVAR and CSB (2017)    Objective   Heart Rate:  [68-82]   Temperature:  [36.6 °C (97.9 °F)]   Respirations:  [14-20]   BP: (172-212)/(104-139)   Height:  [182.9 cm (6')]   Weight:  [91.6 kg (202 lb)]   Pulse Ox:  [94 %-97 %]     Physical Exam:  General: NAD, AAOx3  Neuro: no gross deficits, speech WNL  HEENT: NC/AT, point tenderness over the right cervical paraspinal muscles; patient maintains his neck stiff and without rotation when he moves  CV: RRR  Pulse exam: 2+ radial, 2+ femoral, 2+ DP, 2+ carotid pulses  Pulm: normal respiratory effort on RA  Abd: " soft, NTND  Extr: FROM, no deformities, no swelling  Skin: no lesions or rashes     ROS:  12-point review of systems was performed and is negative except as noted above.     Labs:  Results from last 7 days   Lab Units 01/31/25  0243   WBC AUTO x10*3/uL 8.6   HEMOGLOBIN g/dL 14.1   HEMATOCRIT % 38.5*   PLATELETS AUTO x10*3/uL 210     Results from last 7 days   Lab Units 01/31/25  0243   SODIUM mmol/L 136   POTASSIUM mmol/L 3.9   CHLORIDE mmol/L 100   CO2 mmol/L 26   BUN mg/dL 13   CREATININE mg/dL 0.98   GLUCOSE mg/dL 125*   CALCIUM mg/dL 9.2       Imaging:  Reviewed independently by vascular team:  CTA neck from today: dissection of distal left vertebral artery up into skull base, unclear if acute or chronic but with maintained patent flow into spinal artery  CTA chest/abdomen/pelvis from today: stable appearance of thoracic aortic dissection and perivisceral aortic aneurysm from CTA done 9/22/2023    Assessment/Plan   64 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 This pain does not seem associated with the vertebral artery and seems more related to a musculoskeletal issue due to the reproducible point tenderness over the paraspinal muscles and worsening with neck rotation. Patient should be on daily ASA-81 for this.    His hypertension in the ER here is intense and it would be best for him to be admitted to a medical service for antihypertensive treatment. That is our recommendation, but the patient refuses treatment of his blood pressure.     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 The proximal aortic repair appears stable from appearance 2 years ago, and his aneurysm is unchanged in size. We will reestablish follow up with him in the office.     D/w attending, Dr. Kimberley Whiting MD  Vascular Surgery Fellow  Service Pager: [phone]  Available over Epic Chat

## 2025-02-09 ENCOUNTER — CLINICAL SUPPORT (OUTPATIENT)
Dept: EMERGENCY MEDICINE | Facility: HOSPITAL | Age: 65
DRG: 305 | End: 2025-02-09
Payer: COMMERCIAL

## 2025-02-09 ENCOUNTER — APPOINTMENT (OUTPATIENT)
Dept: RADIOLOGY | Facility: HOSPITAL | Age: 65
DRG: 305 | End: 2025-02-09
Payer: COMMERCIAL

## 2025-02-09 ENCOUNTER — HOSPITAL ENCOUNTER (INPATIENT)
Facility: HOSPITAL | Age: 65
LOS: 2 days | Discharge: HOME | DRG: 305 | End: 2025-02-12
Attending: EMERGENCY MEDICINE | Admitting: STUDENT IN AN ORGANIZED HEALTH CARE EDUCATION/TRAINING PROGRAM
Payer: COMMERCIAL

## 2025-02-09 DIAGNOSIS — M62.838 MUSCLE SPASM: ICD-10-CM

## 2025-02-09 DIAGNOSIS — I10 PRIMARY HYPERTENSION: Primary | ICD-10-CM

## 2025-02-09 LAB
ABO GROUP (TYPE) IN BLOOD: NORMAL
ALBUMIN SERPL BCP-MCNC: 4.3 G/DL (ref 3.4–5)
ALP SERPL-CCNC: 97 U/L (ref 33–136)
ALT SERPL W P-5'-P-CCNC: 8 U/L (ref 10–52)
ANION GAP BLDV CALCULATED.4IONS-SCNC: 7 MMOL/L (ref 10–25)
ANION GAP SERPL CALC-SCNC: 11 MMOL/L (ref 10–20)
ANTIBODY SCREEN: NORMAL
APTT PPP: 34 SECONDS (ref 27–38)
AST SERPL W P-5'-P-CCNC: 14 U/L (ref 9–39)
ATRIAL RATE: 96 BPM
BASE EXCESS BLDV CALC-SCNC: 5.2 MMOL/L (ref -2–3)
BASOPHILS # BLD AUTO: 0.05 X10*3/UL (ref 0–0.1)
BASOPHILS NFR BLD AUTO: 0.8 %
BILIRUB SERPL-MCNC: 0.6 MG/DL (ref 0–1.2)
BNP SERPL-MCNC: 25 PG/ML (ref 0–99)
BODY TEMPERATURE: 37 DEGREES CELSIUS
BUN SERPL-MCNC: 18 MG/DL (ref 6–23)
CA-I BLDV-SCNC: 1.25 MMOL/L (ref 1.1–1.33)
CALCIUM SERPL-MCNC: 9.7 MG/DL (ref 8.6–10.6)
CARDIAC TROPONIN I PNL SERPL HS: 53 NG/L (ref 0–53)
CARDIAC TROPONIN I PNL SERPL HS: 53 NG/L (ref 0–53)
CHLORIDE BLDV-SCNC: 104 MMOL/L (ref 98–107)
CHLORIDE SERPL-SCNC: 103 MMOL/L (ref 98–107)
CO2 SERPL-SCNC: 27 MMOL/L (ref 21–32)
CREAT SERPL-MCNC: 1.09 MG/DL (ref 0.5–1.3)
EGFRCR SERPLBLD CKD-EPI 2021: 76 ML/MIN/1.73M*2
EOSINOPHIL # BLD AUTO: 0.18 X10*3/UL (ref 0–0.7)
EOSINOPHIL NFR BLD AUTO: 2.8 %
ERYTHROCYTE [DISTWIDTH] IN BLOOD BY AUTOMATED COUNT: 11.3 % (ref 11.5–14.5)
GLUCOSE BLDV-MCNC: 135 MG/DL (ref 74–99)
GLUCOSE SERPL-MCNC: 125 MG/DL (ref 74–99)
HCO3 BLDV-SCNC: 30.5 MMOL/L (ref 22–26)
HCT VFR BLD AUTO: 40.7 % (ref 41–52)
HCT VFR BLD EST: 47 % (ref 41–52)
HGB BLD-MCNC: 14.9 G/DL (ref 13.5–17.5)
HGB BLDV-MCNC: 15.7 G/DL (ref 13.5–17.5)
IMM GRANULOCYTES # BLD AUTO: 0.02 X10*3/UL (ref 0–0.7)
IMM GRANULOCYTES NFR BLD AUTO: 0.3 % (ref 0–0.9)
INR PPP: 1 (ref 0.9–1.1)
LACTATE BLDV-SCNC: 1.1 MMOL/L (ref 0.4–2)
LYMPHOCYTES # BLD AUTO: 2.4 X10*3/UL (ref 1.2–4.8)
LYMPHOCYTES NFR BLD AUTO: 36.7 %
MAGNESIUM SERPL-MCNC: 2.02 MG/DL (ref 1.6–2.4)
MCH RBC QN AUTO: 31.6 PG (ref 26–34)
MCHC RBC AUTO-ENTMCNC: 36.6 G/DL (ref 32–36)
MCV RBC AUTO: 86 FL (ref 80–100)
MONOCYTES # BLD AUTO: 0.53 X10*3/UL (ref 0.1–1)
MONOCYTES NFR BLD AUTO: 8.1 %
NEUTROPHILS # BLD AUTO: 3.36 X10*3/UL (ref 1.2–7.7)
NEUTROPHILS NFR BLD AUTO: 51.3 %
NRBC BLD-RTO: 0 /100 WBCS (ref 0–0)
OXYHGB MFR BLDV: 71.4 % (ref 45–75)
P AXIS: 72 DEGREES
P OFFSET: 180 MS
P ONSET: 114 MS
PCO2 BLDV: 46 MM HG (ref 41–51)
PH BLDV: 7.43 PH (ref 7.33–7.43)
PLATELET # BLD AUTO: 214 X10*3/UL (ref 150–450)
PO2 BLDV: 45 MM HG (ref 35–45)
POTASSIUM BLDV-SCNC: 3.9 MMOL/L (ref 3.5–5.3)
POTASSIUM SERPL-SCNC: 3.7 MMOL/L (ref 3.5–5.3)
PR INTERVAL: 218 MS
PROT SERPL-MCNC: 7.4 G/DL (ref 6.4–8.2)
PROTHROMBIN TIME: 11.3 SECONDS (ref 9.8–12.8)
Q ONSET: 223 MS
QRS COUNT: 15 BEATS
QRS DURATION: 96 MS
QT INTERVAL: 334 MS
QTC CALCULATION(BAZETT): 421 MS
QTC FREDERICIA: 390 MS
R AXIS: 69 DEGREES
RBC # BLD AUTO: 4.72 X10*6/UL (ref 4.5–5.9)
RH FACTOR (ANTIGEN D): NORMAL
SAO2 % BLDV: 73 % (ref 45–75)
SODIUM BLDV-SCNC: 138 MMOL/L (ref 136–145)
SODIUM SERPL-SCNC: 137 MMOL/L (ref 136–145)
T AXIS: 4 DEGREES
T OFFSET: 390 MS
VENTRICULAR RATE: 96 BPM
WBC # BLD AUTO: 6.5 X10*3/UL (ref 4.4–11.3)

## 2025-02-09 PROCEDURE — 93005 ELECTROCARDIOGRAM TRACING: CPT

## 2025-02-09 PROCEDURE — 36415 COLL VENOUS BLD VENIPUNCTURE: CPT

## 2025-02-09 PROCEDURE — G0378 HOSPITAL OBSERVATION PER HR: HCPCS

## 2025-02-09 PROCEDURE — 74174 CTA ABD&PLVS W/CONTRAST: CPT | Performed by: RADIOLOGY

## 2025-02-09 PROCEDURE — 80053 COMPREHEN METABOLIC PANEL: CPT

## 2025-02-09 PROCEDURE — 85610 PROTHROMBIN TIME: CPT

## 2025-02-09 PROCEDURE — 71275 CT ANGIOGRAPHY CHEST: CPT | Performed by: RADIOLOGY

## 2025-02-09 PROCEDURE — 83735 ASSAY OF MAGNESIUM: CPT

## 2025-02-09 PROCEDURE — 84132 ASSAY OF SERUM POTASSIUM: CPT

## 2025-02-09 PROCEDURE — 99291 CRITICAL CARE FIRST HOUR: CPT | Mod: 25 | Performed by: EMERGENCY MEDICINE

## 2025-02-09 PROCEDURE — 71045 X-RAY EXAM CHEST 1 VIEW: CPT

## 2025-02-09 PROCEDURE — 84484 ASSAY OF TROPONIN QUANT: CPT

## 2025-02-09 PROCEDURE — 71275 CT ANGIOGRAPHY CHEST: CPT

## 2025-02-09 PROCEDURE — 2550000001 HC RX 255 CONTRASTS: Mod: SE | Performed by: EMERGENCY MEDICINE

## 2025-02-09 PROCEDURE — 99291 CRITICAL CARE FIRST HOUR: CPT | Performed by: EMERGENCY MEDICINE

## 2025-02-09 PROCEDURE — 71045 X-RAY EXAM CHEST 1 VIEW: CPT | Performed by: RADIOLOGY

## 2025-02-09 PROCEDURE — 86901 BLOOD TYPING SEROLOGIC RH(D): CPT

## 2025-02-09 PROCEDURE — 70498 CT ANGIOGRAPHY NECK: CPT

## 2025-02-09 PROCEDURE — 85025 COMPLETE CBC W/AUTO DIFF WBC: CPT

## 2025-02-09 PROCEDURE — 83880 ASSAY OF NATRIURETIC PEPTIDE: CPT

## 2025-02-09 PROCEDURE — 70498 CT ANGIOGRAPHY NECK: CPT | Performed by: RADIOLOGY

## 2025-02-09 RX ORDER — LABETALOL 200 MG/1
1 TABLET, FILM COATED ORAL 2 TIMES DAILY
Status: DISCONTINUED | OUTPATIENT
Start: 2025-02-10 | End: 2025-02-10

## 2025-02-09 RX ORDER — LABETALOL 200 MG/1
1 TABLET, FILM COATED ORAL 2 TIMES DAILY
COMMUNITY
End: 2025-02-12 | Stop reason: HOSPADM

## 2025-02-09 RX ORDER — CLONIDINE HYDROCHLORIDE 0.1 MG/1
0.2 TABLET ORAL 2 TIMES DAILY
Status: DISCONTINUED | OUTPATIENT
Start: 2025-02-10 | End: 2025-02-12 | Stop reason: HOSPADM

## 2025-02-09 RX ORDER — ACETAMINOPHEN 325 MG/1
1000 TABLET ORAL EVERY 6 HOURS PRN
Status: DISCONTINUED | OUTPATIENT
Start: 2025-02-09 | End: 2025-02-11

## 2025-02-09 RX ORDER — CLONIDINE HYDROCHLORIDE 0.2 MG/1
0.2 TABLET ORAL 2 TIMES DAILY
COMMUNITY

## 2025-02-09 RX ORDER — ENOXAPARIN SODIUM 100 MG/ML
40 INJECTION SUBCUTANEOUS EVERY 24 HOURS
Status: DISCONTINUED | OUTPATIENT
Start: 2025-02-10 | End: 2025-02-12 | Stop reason: HOSPADM

## 2025-02-09 RX ORDER — HYDRALAZINE HYDROCHLORIDE 20 MG/ML
10 INJECTION INTRAMUSCULAR; INTRAVENOUS ONCE
Status: DISCONTINUED | OUTPATIENT
Start: 2025-02-09 | End: 2025-02-09

## 2025-02-09 RX ADMIN — IOHEXOL 150 ML: 350 INJECTION, SOLUTION INTRAVENOUS at 16:00

## 2025-02-09 SDOH — SOCIAL STABILITY: SOCIAL INSECURITY: WITHIN THE LAST YEAR, HAVE YOU BEEN AFRAID OF YOUR PARTNER OR EX-PARTNER?: NO

## 2025-02-09 SDOH — SOCIAL STABILITY: SOCIAL INSECURITY: DO YOU FEEL ANYONE HAS EXPLOITED OR TAKEN ADVANTAGE OF YOU FINANCIALLY OR OF YOUR PERSONAL PROPERTY?: NO

## 2025-02-09 SDOH — ECONOMIC STABILITY: FOOD INSECURITY: WITHIN THE PAST 12 MONTHS, YOU WORRIED THAT YOUR FOOD WOULD RUN OUT BEFORE YOU GOT THE MONEY TO BUY MORE.: NEVER TRUE

## 2025-02-09 SDOH — ECONOMIC STABILITY: INCOME INSECURITY: IN THE PAST 12 MONTHS HAS THE ELECTRIC, GAS, OIL, OR WATER COMPANY THREATENED TO SHUT OFF SERVICES IN YOUR HOME?: NO

## 2025-02-09 SDOH — SOCIAL STABILITY: SOCIAL INSECURITY: DOES ANYONE TRY TO KEEP YOU FROM HAVING/CONTACTING OTHER FRIENDS OR DOING THINGS OUTSIDE YOUR HOME?: NO

## 2025-02-09 SDOH — SOCIAL STABILITY: SOCIAL INSECURITY
WITHIN THE LAST YEAR, HAVE YOU BEEN KICKED, HIT, SLAPPED, OR OTHERWISE PHYSICALLY HURT BY YOUR PARTNER OR EX-PARTNER?: NO

## 2025-02-09 SDOH — SOCIAL STABILITY: SOCIAL INSECURITY: WERE YOU ABLE TO COMPLETE ALL THE BEHAVIORAL HEALTH SCREENINGS?: YES

## 2025-02-09 SDOH — SOCIAL STABILITY: SOCIAL INSECURITY: HAS ANYONE EVER THREATENED TO HURT YOUR FAMILY OR YOUR PETS?: NO

## 2025-02-09 SDOH — SOCIAL STABILITY: SOCIAL INSECURITY: ABUSE: ADULT

## 2025-02-09 SDOH — HEALTH STABILITY: PHYSICAL HEALTH: ON AVERAGE, HOW MANY MINUTES DO YOU ENGAGE IN EXERCISE AT THIS LEVEL?: 60 MIN

## 2025-02-09 SDOH — SOCIAL STABILITY: SOCIAL INSECURITY: HAVE YOU HAD ANY THOUGHTS OF HARMING ANYONE ELSE?: NO

## 2025-02-09 SDOH — SOCIAL STABILITY: SOCIAL INSECURITY: ARE YOU OR HAVE YOU BEEN THREATENED OR ABUSED PHYSICALLY, EMOTIONALLY, OR SEXUALLY BY ANYONE?: NO

## 2025-02-09 SDOH — SOCIAL STABILITY: SOCIAL INSECURITY: DO YOU FEEL UNSAFE GOING BACK TO THE PLACE WHERE YOU ARE LIVING?: NO

## 2025-02-09 SDOH — SOCIAL STABILITY: SOCIAL INSECURITY: HAVE YOU HAD THOUGHTS OF HARMING ANYONE ELSE?: NO

## 2025-02-09 SDOH — ECONOMIC STABILITY: FOOD INSECURITY: WITHIN THE PAST 12 MONTHS, THE FOOD YOU BOUGHT JUST DIDN'T LAST AND YOU DIDN'T HAVE MONEY TO GET MORE.: NEVER TRUE

## 2025-02-09 SDOH — SOCIAL STABILITY: SOCIAL INSECURITY
WITHIN THE LAST YEAR, HAVE YOU BEEN RAPED OR FORCED TO HAVE ANY KIND OF SEXUAL ACTIVITY BY YOUR PARTNER OR EX-PARTNER?: NO

## 2025-02-09 SDOH — HEALTH STABILITY: PHYSICAL HEALTH: ON AVERAGE, HOW MANY DAYS PER WEEK DO YOU ENGAGE IN MODERATE TO STRENUOUS EXERCISE (LIKE A BRISK WALK)?: 3 DAYS

## 2025-02-09 SDOH — SOCIAL STABILITY: SOCIAL INSECURITY: WITHIN THE LAST YEAR, HAVE YOU BEEN HUMILIATED OR EMOTIONALLY ABUSED IN OTHER WAYS BY YOUR PARTNER OR EX-PARTNER?: NO

## 2025-02-09 SDOH — SOCIAL STABILITY: SOCIAL INSECURITY: ARE THERE ANY APPARENT SIGNS OF INJURIES/BEHAVIORS THAT COULD BE RELATED TO ABUSE/NEGLECT?: NO

## 2025-02-09 ASSESSMENT — ACTIVITIES OF DAILY LIVING (ADL)
GROOMING: INDEPENDENT
BATHING: INDEPENDENT
TOILETING: INDEPENDENT
HEARING - LEFT EAR: FUNCTIONAL
LACK_OF_TRANSPORTATION: NO
PATIENT'S MEMORY ADEQUATE TO SAFELY COMPLETE DAILY ACTIVITIES?: YES
WALKS IN HOME: INDEPENDENT
FEEDING YOURSELF: INDEPENDENT
ADEQUATE_TO_COMPLETE_ADL: YES
HEARING - RIGHT EAR: FUNCTIONAL
DRESSING YOURSELF: INDEPENDENT
JUDGMENT_ADEQUATE_SAFELY_COMPLETE_DAILY_ACTIVITIES: YES

## 2025-02-09 ASSESSMENT — COGNITIVE AND FUNCTIONAL STATUS - GENERAL
MOBILITY SCORE: 24
DAILY ACTIVITIY SCORE: 24
PATIENT BASELINE BEDBOUND: NO

## 2025-02-09 ASSESSMENT — COLUMBIA-SUICIDE SEVERITY RATING SCALE - C-SSRS
6. HAVE YOU EVER DONE ANYTHING, STARTED TO DO ANYTHING, OR PREPARED TO DO ANYTHING TO END YOUR LIFE?: NO
2. HAVE YOU ACTUALLY HAD ANY THOUGHTS OF KILLING YOURSELF?: NO
1. IN THE PAST MONTH, HAVE YOU WISHED YOU WERE DEAD OR WISHED YOU COULD GO TO SLEEP AND NOT WAKE UP?: NO

## 2025-02-09 ASSESSMENT — PAIN - FUNCTIONAL ASSESSMENT: PAIN_FUNCTIONAL_ASSESSMENT: 0-10

## 2025-02-09 ASSESSMENT — PATIENT HEALTH QUESTIONNAIRE - PHQ9
1. LITTLE INTEREST OR PLEASURE IN DOING THINGS: NOT AT ALL
SUM OF ALL RESPONSES TO PHQ9 QUESTIONS 1 & 2: 0
2. FEELING DOWN, DEPRESSED OR HOPELESS: NOT AT ALL

## 2025-02-09 ASSESSMENT — LIFESTYLE VARIABLES
HOW OFTEN DO YOU HAVE A DRINK CONTAINING ALCOHOL: NEVER
HOW OFTEN DO YOU HAVE 6 OR MORE DRINKS ON ONE OCCASION: NEVER
HOW MANY STANDARD DRINKS CONTAINING ALCOHOL DO YOU HAVE ON A TYPICAL DAY: PATIENT DOES NOT DRINK
SKIP TO QUESTIONS 9-10: 1
AUDIT-C TOTAL SCORE: 0
AUDIT-C TOTAL SCORE: 0

## 2025-02-09 ASSESSMENT — PAIN SCALES - GENERAL: PAINLEVEL_OUTOF10: 0 - NO PAIN

## 2025-02-09 NOTE — PROGRESS NOTES
ED Attending Attestation Note:    This patient was seen by the resident physician. I have seen and examined the patient, agree with the workup, evaluation, management and diagnosis. The care plan has been discussed and I concur.    My assessment reveals the following:    HPI:  Patient is a 65 y/o male presenting with elevated BP at home via wrist BP monitor. Took home BP meds due to elevated BP with SBP reading in the 300s. Reports some BLE paresthesias, but no headache, chest pain, SOB, N/V/, abd pain. H/o aortic dissection years ago. Was here last week for neck pain and dx with possible left vertebral artery dissection. Seen by vascular surgery then, who thought pain was more likely due to MSK issues. Advised admission for BP control, but patient declined admission because he does not feel well when his BP is lowered. No F/C. Patient requires my immediate attention due to elevated SBP>200 upon ED arrival.     PE:  Vital signs reviewed in nursing triage note, EMR flowsheets, and at patient's bedside  GEN: Patient is awake, alert, calm, cooperative, and in moderate to severe hemodynamic distress.  HEAD: Normocephalic and atraumatic.  EYES: Anicteric sclera.  MOUTH: Mucous membranes moist.  CV: Regular rate and rhythm. (+) s1/s2. No murmurs/rubs/gallops.  PULM: CTAB. No wheezes, rales, or crackles.  GI: Soft, non-tender, non-distended without rebound or guarding.  EXT: No deformities noted.   NEURO: Moves all extremities.   SKIN: Warm, dry. No erythema or ecchymosis.      Labs Reviewed   CBC WITH AUTO DIFFERENTIAL - Abnormal       Result Value    WBC 6.5      nRBC 0.0      RBC 4.72      Hemoglobin 14.9      Hematocrit 40.7 (*)     MCV 86      MCH 31.6      MCHC 36.6 (*)     RDW 11.3 (*)     Platelets 214      Neutrophils % 51.3      Immature Granulocytes %, Automated 0.3      Lymphocytes % 36.7      Monocytes % 8.1      Eosinophils % 2.8      Basophils % 0.8      Neutrophils Absolute 3.36      Immature Granulocytes  Absolute, Automated 0.02      Lymphocytes Absolute 2.40      Monocytes Absolute 0.53      Eosinophils Absolute 0.18      Basophils Absolute 0.05     COMPREHENSIVE METABOLIC PANEL - Abnormal    Glucose 125 (*)     Sodium 137      Potassium 3.7      Chloride 103      Bicarbonate 27      Anion Gap 11      Urea Nitrogen 18      Creatinine 1.09      eGFR 76      Calcium 9.7      Albumin 4.3      Alkaline Phosphatase 97      Total Protein 7.4      AST 14      Bilirubin, Total 0.6      ALT 8 (*)    BLOOD GAS VENOUS FULL PANEL UNSOLICITED - Abnormal    POCT pH, Venous 7.43      POCT pCO2, Venous 46      POCT pO2, Venous 45      POCT SO2, Venous 73      POCT Oxy Hemoglobin, Venous 71.4      POCT Hematocrit Calculated, Venous 47.0      POCT Sodium, Venous 138      POCT Potassium, Venous 3.9      POCT Chloride, Venous 104      POCT Ionized Calicum, Venous 1.25      POCT Glucose, Venous 135 (*)     POCT Lactate, Venous 1.1      POCT Base Excess, Venous 5.2 (*)     POCT HCO3 Calculated, Venous 30.5 (*)     POCT Hemoglobin, Venous 15.7      POCT Anion Gap, Venous 7.0 (*)     Patient Temperature 37.0     MAGNESIUM - Normal    Magnesium 2.02     B-TYPE NATRIURETIC PEPTIDE - Normal    BNP 25      Narrative:        <100 pg/mL - Heart failure unlikely  100-299 pg/mL - Intermediate probability of acute heart                  failure exacerbation. Correlate with clinical                  context and patient history.    >=300 pg/mL - Heart Failure likely. Correlate with clinical                  context and patient history.     Biotin interference may cause falsely decreased results. Patients taking a Biotin dose of up to 5 mg/day should refrain from taking Biotin for 24 hours before sample  collection. Providers may contact their local laboratory for further information.   COAGULATION SCREEN - Normal    Protime 11.3      INR 1.0      aPTT 34      Narrative:     The APTT is no longer used for monitoring Unfractionated Heparin Therapy.  For monitoring Heparin Therapy, use the Heparin Assay.   SERIAL TROPONIN-INITIAL - Normal    Troponin I, High Sensitivity ([facility]) 53      Narrative:     Less than 99th percentile of normal range cutoff-  Female and children under 18 years old <35 ng/L; Male <54 ng/L: Negative  Repeat testing should be performed if clinically indicated.     Female and children under 18 years old  ng/L; Male  ng/L:  Consistent with possible cardiac damage and possible increased clinical   risk. Serial measurements may help to assess extent of myocardial damage.     >120 ng/L: Consistent with cardiac damage, increased clinical risk and  myocardial infarction. Serial measurements may help assess extent of   myocardial damage.      NOTE: Children less than 1 year old may have higher baseline troponin   levels and results should be interpreted in conjunction with the overall   clinical context.    NOTE: Troponin I testing is performed using a different   testing methodology at [facility] than at other   [facility]. Direct result comparisons should only   be made within the same method.     SERIAL TROPONIN, 1 HOUR - Normal    Troponin I, High Sensitivity ([facility]) 53      Narrative:     Less than 99th percentile of normal range cutoff-  Female and children under 18 years old <35 ng/L; Male <54 ng/L: Negative  Repeat testing should be performed if clinically indicated.     Female and children under 18 years old  ng/L; Male  ng/L:  Consistent with possible cardiac damage and possible increased clinical   risk. Serial measurements may help to assess extent of myocardial damage.     >120 ng/L: Consistent with cardiac damage, increased clinical risk and  myocardial infarction. Serial measurements may help assess extent of   myocardial damage.      NOTE: Children less than 1 year old may have higher baseline troponin   levels and results should be interpreted in conjunction with the overall   clinical  context.    NOTE: Troponin I testing is performed using a different   testing methodology at [facility] than at other   [facility]. Direct result comparisons should only   be made within the same method.     TROPONIN SERIES- (INITIAL, 1 HR)    Narrative:     The following orders were created for panel order Troponin I Series, High Sensitivity (0, 1 HR).  Procedure                               Abnormality         Status                     ---------                               -----------         ------                     Troponin I, High Sensiti...[[id number]]  Normal              Final result               Troponin, High Sensitivi...[[id number]]  Normal              Final result                 Please view results for these tests on the individual orders.   TYPE AND SCREEN    ABO TYPE O      Rh TYPE POS      ANTIBODY SCREEN NEG       CT angio chest abdomen pelvis   Preliminary Result   1. Stable postsurgical changes of TEVAR with carotid subclavian   bypass for type B aortic dissection. No evidence of contrast   extravasation to suggest an endoleak. Aortic arch great vessels are   patent.   2. No evidence of abdominal aortic aneurysm or dissection.   3. No acute process of the chest, abdomen, or pelvis.   4. Additional stable findings as discussed above.             I personally reviewed the images/study and I agree with the findings   as stated by Molly Carrera DO, PGY-3. This study was interpreted   at [facility], [place], [place].        MACRO:   None.             Dictation workstation:   [id number]      CT angio neck   Final Result   *There is continuous opacification with slight narrowing of the   cervical portion of the left vertebral artery. *The right vertebral   artery is normal *The bilateral carotid arteries are normal.        MACRO:   none        Signed by: Pato Smith 2/9/2025 5:38 PM   Dictation workstation:   [id number]      XR chest  1 view   Final Result   1.  No evidence of acute cardiopulmonary process.        I personally reviewed the images/study and I agree with the findings   as stated by Molly Carrera DO, PGY-3. This study was interpreted   at [facility], [place], [place].        MACRO:   None        Signed by: Tanvir Fran 2/9/2025 5:23 PM   Dictation workstation:   [id number]            Medical Decision Making:  - Monitor  - IV  - Labs  - CTA neck/c/a/p with IV contrast  - BP control as needed.  - Will need to be admitted. Patient agreeable to admission today.   - Patient declining IV BP meds.  - Cardiology consulted.  - Will admit to CICU given elevated BP and patient's declining IV BP meds.     EKG: EKG independently reviewed by the attending ED physician, and I and concur with the resident's/advanced practice provider's interpretation. Sinus rhythm at 96 bpm, normal axis, normal intervals except  ms indicating a first degree AV block, ?ST depressions inferiorly, but wandering baseline present. Similar to old EKG on 1/31/25.    Differential Diagnoses Considered: Malignant HTN, Accelerated HTN, Hypertensive emergency, Aortic dissection, Vertebral artery dissection.     Chronic Medical Conditions Significantly Affecting Care: HTN, Aortic dissection.     External Records Reviewed: I reviewed recent and relevant outside records including: Provider notes and imaging results from 1/31/25 ED visit.     Independent Interpretation of Studies:  I independently interpreted: CXR showing no acute cardiopulmonary disease.    Escalation of Care:  Appropriate for inpatient management    Discussion of Management with Other Providers:   I discussed the patient/results with: Cardiology

## 2025-02-09 NOTE — ED TRIAGE NOTES
Pt coming in from home for HTN. Pt doing at home checks and got BP of 320/180. Reports taking his clonidine and labetalol prior to leaving house. Denies CP, abd, back pain, Sob on arrival. Reports numbness in feet and slight nausea. For ems /142. A&Ox4. Hx HTN and dissection with repair 7 years ago.

## 2025-02-09 NOTE — ED PROCEDURE NOTE
I called the patient and left a voice message to call to schedule a follow up appointment in order to have her medication refilled.    Procedure  Critical Care    Performed by: Ladarius Perdomo MD  Authorized by: Ladarius Perdomo MD    Critical care provider statement:     Critical care time (minutes):  35    Critical care time was exclusive of:  Separately billable procedures and treating other patients    Critical care was necessary to treat or prevent imminent or life-threatening deterioration of the following conditions: Severe hypertension.    Critical care was time spent personally by me on the following activities:  Ordering and performing treatments and interventions, ordering and review of laboratory studies, development of treatment plan with patient or surrogate, ordering and review of radiographic studies, pulse oximetry, re-evaluation of patient's condition, evaluation of patient's response to treatment, examination of patient, review of old charts, interpretation of cardiac output measurements and obtaining history from patient or surrogate               Ladarius Perdomo MD  02/09/25 2129

## 2025-02-09 NOTE — ED PROVIDER NOTES
History of Present Illness     History provided by: Patient and EMS  Limitations to History: None  External Records Reviewed with Brief Summary:  Previous records including outpatient appointments    HPI:  Sreekanth Greenfield is a 64 y.o. male 64-year-old gentleman past medical history of anxiety, PTSD, treatment resistant hypertension type B aortic dissection status post TEVAR in 2018 who presents today for hypertension.  Patient states that he frequently takes his hypertension at home given the fact that he has had difficult to control hypertension.  He states that his blood pressure was as high as 300/180.  He denies any chest pain or shortness of breath, but did feel very anxious.  Given this, he called 911.  He did also take his as needed blood pressure meds of labetalol 300 mg and clonidine 0.2.  He states that since arriving here, he feels less anxious, however, is worried that his blood pressure is not improving.  Per EMS, his blood pressure was initially 260/180 upon their arrival, did continue to decrease to 240s over 160.  Patient denies any headache changes in his vision numbness weakness tingling his arms or legs.  Denies any nausea vomiting diarrhea abdominal pain.  Denies any missed doses of his home medicines.    Physical Exam   Triage vitals:  T    HR 99  BP (!) 208/156  RR 16  O2 99 % None (Room air)    Physical Exam  Vitals and nursing note reviewed.   Constitutional:       General: He is not in acute distress.     Appearance: Normal appearance. He is not ill-appearing or diaphoretic.      Comments: Anxious   HENT:      Head: Normocephalic and atraumatic.      Nose: No congestion or rhinorrhea.      Mouth/Throat:      Mouth: Mucous membranes are moist.      Pharynx: No oropharyngeal exudate or posterior oropharyngeal erythema.   Eyes:      General: No scleral icterus.     Extraocular Movements: Extraocular movements intact.      Pupils: Pupils are equal, round, and reactive to light.    Cardiovascular:      Rate and Rhythm: Normal rate and regular rhythm.      Pulses: Normal pulses.      Heart sounds: Normal heart sounds. No murmur heard.     No gallop.      Comments: Palpable radial femoral DP and PT pulses bilaterally  Pulmonary:      Effort: Pulmonary effort is normal. No respiratory distress.      Breath sounds: Normal breath sounds. No stridor. No wheezing, rhonchi or rales.   Abdominal:      General: Bowel sounds are normal. There is no distension.      Palpations: Abdomen is soft. There is no mass.      Tenderness: There is no abdominal tenderness. There is no right CVA tenderness, left CVA tenderness, guarding or rebound.      Hernia: No hernia is present.   Musculoskeletal:         General: No swelling, deformity or signs of injury. Normal range of motion.      Cervical back: Normal range of motion and neck supple. No tenderness.   Skin:     General: Skin is warm and dry.      Capillary Refill: Capillary refill takes less than 2 seconds.      Findings: No erythema, lesion or rash.   Neurological:      General: No focal deficit present.      Mental Status: He is alert and oriented to person, place, and time. Mental status is at baseline.   Psychiatric:         Mood and Affect: Mood normal.         Behavior: Behavior normal.          Medical Decision Making & ED Course   Medical Decision Makin y.o. male past medical history of hypertension hyperlipidemia previous type B dissection status post TEVAR as well as PTSD and anxiety who presents today for hypertension.  Patient is hypertensive here, given the fact that he did take his home meds just prior to EMS arrival, we will monitor his blood pressure for 30 minutes prior to offering additional medications.  Addition, we will get a CBC BMP troponin and BNP.  Will also get a chest x-ray as well as a CT angio of the chest abdomen pelvis to confirm the patient does not have any evidence of a dissection recurrence.  Given the fact the  patient is pain-free, my concern for this is low, however, his blood pressure is high enough that could generate hyperacute issues.  Patient's blood pressure remained elevated, he declined IV medications, requested to take his home labetalol again.  Given this, we did let him do this.  I discussed patient's case with the admissions coordinator, who initially admitted him to cardiology.  However, the patient's blood pressure was still in the 180s over 120s, and after lengthy discussion with the cardiology fellow for the floor, he felt the nursing staff likely would not accept the patient to the floor.  Given this, the patient will be admitted to the CICU.  Patient CT scan does not demonstrate any evidence of active extravasation, no enlargement of his dissection.  His troponins and BNP were within normal limits, which is reassuring.  ----      Differential diagnoses considered include but are not limited to: ACS, hypertensive emergency, malignant hypertension     Social Determinants of Health which Significantly Impact Care: None identified     EKG Independent Interpretation:  EKG demonstrates normal sinus rhythm, normal QRS WA and QTc intervals, questionable depressions in leads II, III and aVF, similar to previous EKGs.    Independent Result Review and Interpretation: Relevant laboratory and radiographic results were reviewed and independently interpreted by myself.  As necessary, they are commented on in the ED Course.    Chronic conditions affecting the patient's care: As documented above in [facility]    The patient was discussed with the following consultants/services: Admission Coordinator who accepted the patient for admission    Care Considerations: As documented above in [facility]    ED Course:  Diagnoses as of 02/09/25 1826   Primary hypertension     Disposition   As a result of their workup, the patient will require admission to the hospital.  The patient was informed of his diagnosis.  The patient was given the  opportunity to ask questions and I answered them. The patient agreed to be admitted to the hospital.    Procedures   Procedures    Patient seen and discussed with ED attending physician.    Jairon Shah MD  Emergency Medicine       Jairon Shah MD  Resident  02/10/25 2018

## 2025-02-10 LAB
ALBUMIN SERPL BCP-MCNC: 3.9 G/DL (ref 3.4–5)
ANION GAP SERPL CALC-SCNC: 11 MMOL/L (ref 10–20)
BASOPHILS # BLD AUTO: 0.04 X10*3/UL (ref 0–0.1)
BASOPHILS NFR BLD AUTO: 0.6 %
BUN SERPL-MCNC: 12 MG/DL (ref 6–23)
CALCIUM SERPL-MCNC: 9.3 MG/DL (ref 8.6–10.6)
CHLORIDE SERPL-SCNC: 103 MMOL/L (ref 98–107)
CO2 SERPL-SCNC: 28 MMOL/L (ref 21–32)
CREAT SERPL-MCNC: 1 MG/DL (ref 0.5–1.3)
EGFRCR SERPLBLD CKD-EPI 2021: 85 ML/MIN/1.73M*2
EOSINOPHIL # BLD AUTO: 0.13 X10*3/UL (ref 0–0.7)
EOSINOPHIL NFR BLD AUTO: 1.9 %
ERYTHROCYTE [DISTWIDTH] IN BLOOD BY AUTOMATED COUNT: 11.9 % (ref 11.5–14.5)
GLUCOSE SERPL-MCNC: 126 MG/DL (ref 74–99)
HCT VFR BLD AUTO: 39.4 % (ref 41–52)
HGB BLD-MCNC: 14.4 G/DL (ref 13.5–17.5)
IMM GRANULOCYTES # BLD AUTO: 0.01 X10*3/UL (ref 0–0.7)
IMM GRANULOCYTES NFR BLD AUTO: 0.1 % (ref 0–0.9)
LYMPHOCYTES # BLD AUTO: 1.9 X10*3/UL (ref 1.2–4.8)
LYMPHOCYTES NFR BLD AUTO: 28.4 %
MAGNESIUM SERPL-MCNC: 2.03 MG/DL (ref 1.6–2.4)
MCH RBC QN AUTO: 31.5 PG (ref 26–34)
MCHC RBC AUTO-ENTMCNC: 36.5 G/DL (ref 32–36)
MCV RBC AUTO: 86 FL (ref 80–100)
MONOCYTES # BLD AUTO: 0.47 X10*3/UL (ref 0.1–1)
MONOCYTES NFR BLD AUTO: 7 %
NEUTROPHILS # BLD AUTO: 4.13 X10*3/UL (ref 1.2–7.7)
NEUTROPHILS NFR BLD AUTO: 62 %
NRBC BLD-RTO: 0 /100 WBCS (ref 0–0)
PHOSPHATE SERPL-MCNC: 2.9 MG/DL (ref 2.5–4.9)
PLATELET # BLD AUTO: 176 X10*3/UL (ref 150–450)
POTASSIUM SERPL-SCNC: 4.1 MMOL/L (ref 3.5–5.3)
RBC # BLD AUTO: 4.57 X10*6/UL (ref 4.5–5.9)
SODIUM SERPL-SCNC: 138 MMOL/L (ref 136–145)
WBC # BLD AUTO: 6.7 X10*3/UL (ref 4.4–11.3)

## 2025-02-10 PROCEDURE — 99222 1ST HOSP IP/OBS MODERATE 55: CPT | Performed by: STUDENT IN AN ORGANIZED HEALTH CARE EDUCATION/TRAINING PROGRAM

## 2025-02-10 PROCEDURE — 85025 COMPLETE CBC W/AUTO DIFF WBC: CPT

## 2025-02-10 PROCEDURE — 36415 COLL VENOUS BLD VENIPUNCTURE: CPT

## 2025-02-10 PROCEDURE — 83735 ASSAY OF MAGNESIUM: CPT

## 2025-02-10 PROCEDURE — 2020000001 HC ICU ROOM DAILY

## 2025-02-10 PROCEDURE — 2500000001 HC RX 250 WO HCPCS SELF ADMINISTERED DRUGS (ALT 637 FOR MEDICARE OP): Mod: SE

## 2025-02-10 PROCEDURE — 80069 RENAL FUNCTION PANEL: CPT

## 2025-02-10 PROCEDURE — 99291 CRITICAL CARE FIRST HOUR: CPT

## 2025-02-10 RX ORDER — POLYETHYLENE GLYCOL 3350 17 G/17G
17 POWDER, FOR SOLUTION ORAL DAILY PRN
Status: DISCONTINUED | OUTPATIENT
Start: 2025-02-10 | End: 2025-02-12 | Stop reason: HOSPADM

## 2025-02-10 RX ORDER — HYDRALAZINE HYDROCHLORIDE 25 MG/1
25 TABLET, FILM COATED ORAL 3 TIMES DAILY
Status: DISCONTINUED | OUTPATIENT
Start: 2025-02-10 | End: 2025-02-10

## 2025-02-10 RX ORDER — LABETALOL 200 MG/1
1 TABLET, FILM COATED ORAL EVERY 8 HOURS
Status: DISCONTINUED | OUTPATIENT
Start: 2025-02-10 | End: 2025-02-12 | Stop reason: HOSPADM

## 2025-02-10 RX ADMIN — CLONIDINE HYDROCHLORIDE 0.2 MG: 0.1 TABLET ORAL at 12:30

## 2025-02-10 RX ADMIN — LABETALOL HYDROCHLORIDE 200 MG: 200 TABLET, FILM COATED ORAL at 08:25

## 2025-02-10 RX ADMIN — CLONIDINE HYDROCHLORIDE 0.2 MG: 0.1 TABLET ORAL at 00:14

## 2025-02-10 RX ADMIN — LABETALOL HYDROCHLORIDE 200 MG: 200 TABLET, FILM COATED ORAL at 20:14

## 2025-02-10 SDOH — ECONOMIC STABILITY: FOOD INSECURITY: WITHIN THE PAST 12 MONTHS, YOU WORRIED THAT YOUR FOOD WOULD RUN OUT BEFORE YOU GOT THE MONEY TO BUY MORE.: NEVER TRUE

## 2025-02-10 SDOH — ECONOMIC STABILITY: HOUSING INSECURITY: AT ANY TIME IN THE PAST 12 MONTHS, WERE YOU HOMELESS OR LIVING IN A SHELTER (INCLUDING NOW)?: NO

## 2025-02-10 SDOH — SOCIAL STABILITY: SOCIAL INSECURITY: WITHIN THE LAST YEAR, HAVE YOU BEEN AFRAID OF YOUR PARTNER OR EX-PARTNER?: NO

## 2025-02-10 SDOH — ECONOMIC STABILITY: HOUSING INSECURITY: IN THE LAST 12 MONTHS, WAS THERE A TIME WHEN YOU WERE NOT ABLE TO PAY THE MORTGAGE OR RENT ON TIME?: NO

## 2025-02-10 SDOH — ECONOMIC STABILITY: INCOME INSECURITY: IN THE PAST 12 MONTHS HAS THE ELECTRIC, GAS, OIL, OR WATER COMPANY THREATENED TO SHUT OFF SERVICES IN YOUR HOME?: NO

## 2025-02-10 SDOH — SOCIAL STABILITY: SOCIAL INSECURITY: WITHIN THE LAST YEAR, HAVE YOU BEEN HUMILIATED OR EMOTIONALLY ABUSED IN OTHER WAYS BY YOUR PARTNER OR EX-PARTNER?: NO

## 2025-02-10 SDOH — ECONOMIC STABILITY: FOOD INSECURITY: HOW HARD IS IT FOR YOU TO PAY FOR THE VERY BASICS LIKE FOOD, HOUSING, MEDICAL CARE, AND HEATING?: NOT VERY HARD

## 2025-02-10 SDOH — ECONOMIC STABILITY: FOOD INSECURITY: WITHIN THE PAST 12 MONTHS, THE FOOD YOU BOUGHT JUST DIDN'T LAST AND YOU DIDN'T HAVE MONEY TO GET MORE.: NEVER TRUE

## 2025-02-10 SDOH — ECONOMIC STABILITY: HOUSING INSECURITY: IN THE PAST 12 MONTHS, HOW MANY TIMES HAVE YOU MOVED WHERE YOU WERE LIVING?: 0

## 2025-02-10 SDOH — ECONOMIC STABILITY: TRANSPORTATION INSECURITY: IN THE PAST 12 MONTHS, HAS LACK OF TRANSPORTATION KEPT YOU FROM MEDICAL APPOINTMENTS OR FROM GETTING MEDICATIONS?: NO

## 2025-02-10 ASSESSMENT — PAIN - FUNCTIONAL ASSESSMENT
PAIN_FUNCTIONAL_ASSESSMENT: 0-10

## 2025-02-10 ASSESSMENT — PAIN SCALES - GENERAL
PAINLEVEL_OUTOF10: 0 - NO PAIN

## 2025-02-10 ASSESSMENT — ACTIVITIES OF DAILY LIVING (ADL)
LACK_OF_TRANSPORTATION: NO
LACK_OF_TRANSPORTATION: NO

## 2025-02-10 NOTE — CARE PLAN
Problem: Pain - Adult  Goal: Verbalizes/displays adequate comfort level or baseline comfort level  Outcome: Progressing     Problem: Safety - Adult  Goal: Free from fall injury  Outcome: Progressing    The clinical goals for the shift include patient will remain hemodynamically stable.    Over the shift, the patient did not make progress toward the following goal of blood pressure management. Barriers to progression include patient refused recommended blood pressure treatment even after being educated on the risk factors. Recommendations to address these barriers include anxiety management and further education.

## 2025-02-10 NOTE — SIGNIFICANT EVENT
"Capacity Assessment Tool    \"Capacity\" is the \"ability\" to make a decision.  The decision in question must be specific (one decision), relevant to a patient's current condition (appropriate), and timely (neither prospective nor retrospective).    Capacity varies based on knowledge base (explanation/understanding of clinical information), cognitive processing, acute psychiatric illness, and other clinical conditions.    In order to be deemed \"capacitated\" to make a single decision at one point in time, a patient must demonstrate all 4 of the following elements:    *Ability to consistently communicate a choice (consistent over time with adequate information)  *Ability to understand the relevant information (accurate knowledge of condition)  *Ability to appreciate the situation and its consequences (risks/benefits, pros/cons)  *Ability to reason about treatment options (without undue influence of a person or condition, eg. suicidality or acute psychosis)      Current Decision    Discussed with patient in the morning and afternoon on multiple occasions on 2/10/2025 regarding the need for hypertension control to prevent worsening of his known aortic dissection. Patient states that he has a history of adverse reactions to any new medications. He is not interested in starting new medications at this time. States that he would only be amenable to an increase in his current labetalol or clonidine because he understands that he will not have adverse effects from them. He is also reporting significant anxiety regarding this hospitalization. He also reports significant anxiety in the hospital and notes trauma in his past that has made him uncomfortable when being away from home. Offered therapies including pet, music, art, and  services. Patient declined.    Clinical issue:   Hypertensive urgency iso worsening aortic dissection      Did the appropriate team address relevant information with the patient:  Yes    Date: " "2/10/2025    If \"NO\" is selected for appropriate team, then please discuss with the appropriate team.  The appropriate team should be encouraged to address relevant information with the patient AND reevaluate capacity when appropriate.    Capacity Evaluation    Patient demonstrates ability to consistently communicate choice:  Yes - throughout the morning and afternoon with several discussions taking place intermittently, patient consistently states that he does want to start additional antihypertensives.    Patient demonstrates ability to understand the relevant information:  Yes - patient expressed understanding and ability to teach back about his aortic dissection and hypertension, saying \"The blood pressure is everything.\" He has been watching the monitor for his blood pressure readings in the room. He understands his goal is under 140 systolic per vascular surgery.    Patient demonstrates ability to appreciate the situation and its consequences:  Yes - Patient reports that his blood pressure regularly increases while he is in the hospital. I explained to the patient that this is an additional reason for him to stay and receive additional treatment. I explained to the patient that persistently elevated blood pressures above 140 will put him at severe risk of aortic dissection and death. I also spoke with the patient that starting a new medication in the hospital would allow for close monitoring. Despite these statements, patient declined. Using the teach back method, patient explained to me and expressed an understanding that if he continues on his current medication regimen without additional control and leaves the hospital he is at a significant risk of aortic dissection and death. He understands those risks. States that he is [employer] and believes \"If it is my time to go and be with God, then it is time to go.\" He reports that he would be rather be comfortable at home than starting an additional medication in " "the hospital. He also has pets at home and trusted family members or friends are unavailable to take care of them. He is currently willing to stay until 2/12/2025.    Patient demonstrates ability to reason about treatment options:  Yes - patient requests persistently to increase the doses of his home labetalol or clonidine because he reports he is familiar with their side effect profile. He explained to me that clonidine makes his blood pressure fluctuate after receiving a dose at home and will regularly increase before the next dose is due. Despite this, he extremely hesitant to start additional medications because of adverse reactions to Coreg in the past. I explained that other medications may cause him little to no side effects; however, patient disagrees and feels that the risks of these medications outweigh the benefits. Patient is amenable to speaking with pharmacist for further discussion.    If ANY of the above items are answered \"NO,\" the patient LACKS CAPACITY for that specific decision at hand, at that specific time.    At this time, patient is determined to have capacity. Discussions are ongoing while patient is hospitalized. Will continue to educate patient regarding the need for antihypertensives.    Further capacity evaluations can be done as needed.    Jose Raul Nunn MD  PGY-1 Internal Medicine    "

## 2025-02-10 NOTE — ACP (ADVANCE CARE PLANNING)
Confirming Previous Code Status:   Advance Care Planning Note     Discussion Date: 02/10/25   Discussion Participants: patient    The patient wishes to discuss Advance Care Planning today and the following is a brief summary of our discussion.     Patient has capacity to make their own medical decisions: Yes  Health Care Agent/Surrogate Decision Maker documented in chart: Yes    Documents on file and valid:  Advance Directive/Living Will: No   Health Care Power of : No    Communication of Medical Status/Prognosis:   Patient reports that if his heart were to stop or were to stop breathing, he would not want interventions such as CPR and/or intubation. Explained this to patient and patient expresses understanding that if he experienced cardiopulmonary arrest he would pass away without further interventions. Patient agreeable to code status of DNR/DNI.     Treatment Decisions  Goals of Care: quality of life is prioritized; willing to accept low-burden treatments to achieve meaningful goals     Team Members  Melissa Lee RN     Time Statement: Total face to face time spent on advance care planning was 5 minutes with 5 minutes spent in counseling, including the explanation.    Melissa Andrea MD  2/10/2025 3:14 AM

## 2025-02-10 NOTE — PROGRESS NOTES
02/10/25 0959   Discharge Planning   Living Arrangements Alone   Support Systems Family members   Assistance Needed n/a   Do you have animals or pets at home? No   Who is requesting discharge planning? Provider   Home or Post Acute Services   (TBD)   Does the patient need discharge transport arranged? No   Financial Resource Strain   How hard is it for you to pay for the very basics like food, housing, medical care, and heating? Not very   Housing Stability   In the last 12 months, was there a time when you were not able to pay the mortgage or rent on time? N   In the past 12 months, how many times have you moved where you were living? 0   At any time in the past 12 months, were you homeless or living in a shelter (including now)? N   Transportation Needs   In the past 12 months, has lack of transportation kept you from medical appointments or from getting medications? no   In the past 12 months, has lack of transportation kept you from meetings, work, or from getting things needed for daily living? No     - ICU TREATMENT PLAN: Patient presented to ED after elevated BP and admitted to CICU for asymptomatic hypertensive urgency.  - Payer: [facility] Only  -Support System: Siblings  - Planned Disposition: Pending medical outcome and rehab recommendations.  - Additional Information: SDOH and Social Work Discharge Planning assessments were completed with the patient. There were no SDOH issues identified.  - Barriers to discharge: None at this time. SW will continue to follow.

## 2025-02-10 NOTE — PROGRESS NOTES
Daily Progress Note    Sreekanth Greenfield is a 64 y.o. male on day 0 of admission presenting with Primary hypertension.    Subjective   No acute overnight events. Patient seen and examined at bedside. Patient denies CP/SOB, N/V/D. Expressed concern with continued elevated blood pressures.       Objective   Vitals: I/O:   Vitals:    02/10/25 1400   BP: 150/87   Pulse: 69   Resp: 18   Temp:    SpO2: 96%        Temp  Min: 36.2 °C (97.2 °F)  Max: 36.4 °C (97.5 °F)  Pulse  Min: 69  Max: 104  BP  Min: 137/90  Max: 218/127  Resp  Min: 10  Max: 23  SpO2  Min: 90 %  Max: 100 %   Intake/Output Summary (Last 24 hours) at 2/10/2025 1410  Last data filed at 2/10/2025 0900  Gross per 24 hour   Intake --   Output 1050 ml   Net -1050 ml        Net IO Since Admission: -1,050 mL [02/10/25 1410]      Physical Exam  Constitutional:       General: He is not in acute distress.     Appearance: Normal appearance. He is not ill-appearing.   HENT:      Head: Normocephalic and atraumatic.   Cardiovascular:      Rate and Rhythm: Normal rate and regular rhythm.      Pulses: Normal pulses.      Heart sounds: Normal heart sounds. No murmur heard.     No friction rub. No gallop.   Pulmonary:      Effort: Pulmonary effort is normal. No respiratory distress.      Breath sounds: Normal breath sounds. No wheezing.   Abdominal:      General: Abdomen is flat. There is no distension.      Palpations: Abdomen is soft. There is no mass.      Tenderness: There is no abdominal tenderness. There is no guarding or rebound.   Musculoskeletal:         General: No swelling or deformity. Normal range of motion.   Skin:     General: Skin is warm and dry.   Neurological:      General: No focal deficit present.      Mental Status: He is alert and oriented to person, place, and time. Mental status is at baseline.   Psychiatric:         Mood and Affect: Mood normal.         Behavior: Behavior normal.         Medications:  Scheduled Medications PRN Medications   cloNIDine,  0.2 mg, oral, BID  enoxaparin, 40 mg, subcutaneous, q24h  hydrALAZINE, 25 mg, oral, TID  labetalol, 1 tablet, oral, BID      PRN medications: [Held by provider] acetaminophen, polyethylene glycol      Continuous medications       Relevant Results:  Results from last 7 days   Lab Units 02/10/25  0103 02/09/25  1512   WBC AUTO x10*3/uL 6.7 6.5   HEMOGLOBIN g/dL 14.4 14.9   HEMATOCRIT % 39.4* 40.7*   PLATELETS AUTO x10*3/uL 176 214            Results from last 7 days   Lab Units 02/10/25  0257 02/09/25  1512   SODIUM mmol/L 138 137   POTASSIUM mmol/L 4.1 3.7   CHLORIDE mmol/L 103 103   CO2 mmol/L 28 27   BUN mg/dL 12 18   CREATININE mg/dL 1.00 1.09   CALCIUM mg/dL 9.3 9.7     Results from last 7 days   Lab Units 02/09/25  1512   ALK PHOS U/L 97   BILIRUBIN TOTAL mg/dL 0.6   PROTEIN TOTAL g/dL 7.4   ALT U/L 8*   AST U/L 14      Results from last 7 days   Lab Units 02/09/25  1512   APTT seconds 34   INR  1.0      CT angio chest abdomen pelvis   Final Result   1. Stable postsurgical changes of TEVAR with carotid subclavian   bypass for type B aortic dissection.   2. Aneurysmal dilation of the distal descending thoracic aorta   measuring up to 7 cm and stable since at least 09/22/2023. However,   there is aneurysmal dilation of the aorta at the diaphragmatic hiatus   measuring up to 5.7 cm slightly increased from 09/22/2023 when it   measured 5.3 cm.   3. No acute process of the chest, abdomen, or pelvis.   4. Additional stable findings as discussed above.             I personally reviewed the images/study and I agree with the findings   as stated by Molly Carrera DO, PGY-3. This study was interpreted   at [facility], [place], [place].        MACRO:   None.        Signed by: Jani Stevens 2/9/2025 9:08 PM   Dictation workstation:   [id number]      CT angio neck   Final Result   *There is continuous opacification with slight narrowing of the   cervical portion of the left vertebral  artery. *The right vertebral   artery is normal *The bilateral carotid arteries are normal.        MACRO:   none        Signed by: Pato Smith 2/9/2025 5:38 PM   Dictation workstation:   [id number]      XR chest 1 view   Final Result   1.  No evidence of acute cardiopulmonary process.        I personally reviewed the images/study and I agree with the findings   as stated by Molly Carrera DO, PGY-3. This study was interpreted   at [facility], [place], [place].        MACRO:   None        Signed by: Tanvir Peters 2/9/2025 5:23 PM   Dictation workstation:   [id number]             Assessment/Plan      Sreekanth Greenfield is a 64 y.o. male with PMH of of type B aortic dissection sp TEVAR L carotid to L subclavian PTFE bypass (2017), PTSD, HTN who presented to ED after elevated BP of 320/180 via wrist monitor and admitted to CICU for asymptomatic hypertensive urgency. Patient reports having baseline elevated BP ranging from 180s to over 200 consistently via wrist BP monitor and reports being adherent to BP regimen. In ED, patient refused Hydral but took home Labetalol per patient.  On arrival to CICU, patient with -210s, remained asymptomatic, and declined Hydralazine or any new medications because he is apprehensive of side effects. Improvement in BP to -180s after restarting home antihypertensives.    Updates (02/10/25):  Consulted vascular surgery regarding increased dilation of aorta to 5.7 cm at diaphragmatic hiatus  Continue home regimen clonidine 0.2 mg BID and labetalol 200 mg BID. Pharmacy to educate patient regarding additional antihypertensives. Patient currently very hesitant to adjusting regimen.       NEUROLOGY/ PSYCH:  #Hx PTSD  :: Not taking medications  Continue to monitor       CARDIOVASCULAR:  #Asymptomatic hypertensive urgency  #Type B aortic dissection s/p TEVAR L carotid to L subclavian PTFE bypass (2017)  #Aneurysmal dilation of distal  descending thoracic aorta, 7cm and stable   #HTN  :: BP >300 per wrist monitor per pt, in ED SBP 200s s/p 1x PO Labetalol per patient   :: CTA on admission showing stable postsurgical changes of TEVAR with carotid subclavian bypass for type B aortic dissection. Aneurysmal dilation of the distal descending thoracic aorta measuring up to 7 cm and stable since at least 09/22/2023 however dilation of the aorta at the diaphragmatic hiatus measuring up to 5.7 cm slightly increased from 09/22/2023 when it measured 5.3 cm.  :: Patient currently refusing Hydralazine due to concerns about side effects, information provided regarding uses/side effects though patient still hesitant and only requesting home medications   :: s/p 1x Clonidine BP 0.2mg in CICU with reduction in BP 140s-180s  :: Had previously been trialed on carvedilol, patient reports he could not tolerate this   Continue home regimen clonidine 0.2 mg BID and labetalol 200 mg BID  Consulted vascular surgery regarding increased dilation of aorta at diaphragmatic hiatus  Pharmacy to educate patient regarding additional antihypertensives. Patient currently very hesitant to adjusting regimen.       PULMONARY:  HUY        RENAL/ GENITOURINARY:  HUY        GASTROENTEROLOGY:  #Hx of rectal bleeding iso diverticulosis in 12/2022  :: Per chart review last colonoscopies 2011+2012 for rectal bleeding showed colonic diverticula and 2 polyps which were removed, reports unavailable       ENDOCRINOLOGY:  HUY       RHEUMATOLOGY:  HUY        HEMATOLOGY:  HUY        MUSCULOSKELETAL/ SKIN:  HUY        INFECTIOUS DISEASE:  HUY    IVF: PRN  Electrolytes: PRN  Access: PIV    Diet: Adult diet Regular  DVT Ppx: Lovenox  GI Ppx: Not indicated  Bowel regimen: MiraLAX daily PRN  Pain management: Not indicated    Emergency Contact: Extended Emergency Contact Information  Primary Emergency Contact: Jean PierreCatrachita  Mobile Phone: [phone]  Relation: Brother  Preferred language:  English   needed? No  Secondary Emergency Contact: Mandi Greenfield  Home Phone: [phone]  Mobile Phone: [phone]  Relation: Sister  Code status: DNR and No Intubation (confirmed on admission)  PT/OT: Not indicated  Disposition: Inpatient pending further workup of aortic dissection    Jose Raul Nunn MD   PGY-1 Internal Medicine

## 2025-02-10 NOTE — H&P
"MEDICINE ADMISSION NOTE    History of Present Illness  Sreekanth Greenfield is a 64 y.o. male with PMHx of type B aortic dissection sp TEVAR L carotid to L subclavian PTFE bypass (2017), PTSD, HTN who presented to ED after elevated BP of 320/180 via wrist monitor and admitted to CICU for asymptomatic hypertensive urgency.     Patient reports that he was doing his BP check via BP wrist monitor and was \"over 300\" and came to the ED. He states that he is adherent with home HTN regimen of clonidine and labetalol. He states he took medications prior to leaving the house and took dose of labetalol in the ED. He states that he denied any symptoms of chest pain, SOB, nausea, vomiting, vision changes, headache, or other symptoms. He states that he has had multiple stressors in his life recently including his brother being sick with cancer and hospitalized currently, recent death in the family, and other stressors which he believes could have contributed to elevated blood pressure.  He states that since his prior TEVAR L carotid to L subclavian PTFE bypass (2017), he states that he has been taking his blood pressure regularly which runs \"pretty high\" and can range from -250 which he states is typical for him.  He reports being asymptomatic with elevated blood pressures, he states that previously he has had some pressure behind his eyes and abdominal tenderness but states that he has been asymptomatic recently.      Patient also reports that he has \"bad side effects to medications\" and \"I get all the bad side effects\"; referring to having skin rashes and nausea in the past to several medications including carvedilol. He states he is very hesitant to take any new medications because of side effects and assisting him taking only home blood pressure medications to see if this will improve hypertension. Patient denies any fever, chills, lightheadedness, shortness of breath, chest pain, abdominal pain, N/V/C/D, or other " concerns.      ED Course:  Temp: 36.2 °C (97.2 °F)  Temp Source: Tympanic  Heart Rate: 99  Heart Rate Source: Monitor  Resp: 16  BP: (!) 208/156  MAP (mmHg): 157  BP Location: Right arm  BP Method: Automatic  Patient Position: Lying    Imaging:  CTA A/P 2/9  1. Stable postsurgical changes of TEVAR with carotid subclavian  bypass for type B aortic dissection.  2. Aneurysmal dilation of the distal descending thoracic aorta  measuring up to 7 cm and stable since at least 09/22/2023. However,  there is aneurysmal dilation of the aorta at the diaphragmatic hiatus  measuring up to 5.7 cm slightly increased from 09/22/2023 when it  measured 5.3 cm.  3. No acute process of the chest, abdomen, or pelvis.  4. Additional stable findings as discussed above.    CTA Neck 2/9  *There is continuous opacification with slight narrowing of the  cervical portion of the left vertebral artery. *The right vertebral  artery is normal *The bilateral carotid arteries are normal.    In ED:   -Declined Hydral   -Patient reported taking home dose of labetalol     Labs:   CBC: WBC 6.7 , HGB 14.4,   BMP: , K 3.7, Cl 103, HCO3 27, BUN 18, CR 1.09, Glu 125  LFTS: AST 14 , ALT 8, ALKPHOS 97 , TBILI 0.6 , DBILI No results found for requested labs within last 365 days.  TROP: 53  BNP: 25  COAGS: PT 11.3 , PTT 34  , INR 1.0  UA:     ABG:    CALCIUM 9.7 MAG No results found for requested labs within last 365 days. ALB 4.3 LACTATE 1.7 PHOS No results found for requested labs within last 365 days. COVIDNo results found for requested labs within last 365 days.       EKG  Encounter Date: 02/09/25   ECG 12 lead   Result Value    Ventricular Rate 96    Atrial Rate 96    CT Interval 218    QRS Duration 96    QT Interval 334    QTC Calculation(Bazett) 421    P Axis 72    R Axis 69    T Axis 4    QRS Count 15    Q Onset 223    P Onset 114    P Offset 180    T Offset 390    QTC Fredericia 390    Narrative    Sinus rhythm with 1st degree AV  block  Septal infarct , age undetermined  Abnormal ECG  When compared with ECG of 31-JAN-2025 01:45,  Septal infarct is now Present      See ED provider note for full interpretation and clinical correlation  Confirmed by Clara Bar ([id number]) on 2/9/2025 7:54:28 PM        Imaging:  CT angio chest abdomen pelvis    Result Date: 2/9/2025  Interpreted By:  Jani Stevens,  and Deandre Barnes STUDY: CT ANGIO CHEST ABDOMEN PELVIS;  2/9/2025 4:11 pm   INDICATION: Signs/Symptoms:previous dissection s/p TEVAR, hypertensive, anxious, bilateral foot numbness. Per EMR: History of [place] type B aortic dissection status post TEVAR with CSB in 2017.   COMPARISON: CTA chest/abdomen/pelvis 01/31/2025   ACCESSION NUMBER(S): [id number]   ORDERING CLINICIAN: FRANKLIN THOMPSON   TECHNIQUE: Axial non-contrast images of the chest abdomen, and pelvis.   Axial CT images of the chest, abdomen and pelvis were obtained after the intravenous administration of iodinated contrast using angiographic portal venous delayed technique with coronal and sagittal reformatted images. MIP images and 3D reconstructions were created on an independent workstation and reviewed.   FINDINGS: VASCULATURE:   PULMONARY ARTERIES:  No acute pulmonary embolism within limitations of non PE protocol time study. Main pulmonary artery normal in diameter.   AORTA: Stable postsurgical changes of TEVAR with carotid subclavian bypass.   Non-contrast images show no evidence of acute intramural hematoma. The visualized great vessels are patent without evidence of dissection. Minimal aortic atherosclerosis. There is aneurysmal dilatation of the distal descending thoracic aorta including the opacified true lumen and non-opacified false lumen measuring up to 7 cm in its greatest diameter (series 501, image 254) unchanged from at least 09/22/2023. The dissection flap terminates at the diaphragmatic hiatus where there is aneurysmal dilation of the aorta at the diaphragmatic hiatus  measuring 5.7 cm (series 501, image 270) slightly increased from 09/22/2023 when it measured 5.3 cm.   ABDOMINAL AND PELVIC ARTERIES: Celiac, superior mesenteric, and inferior mesenteric arteries are patent. Single renal arteries are patent. Mild multifocal atherosclerosis of the bilateral common iliac arteries and internal iliac arteries without hemodynamically significant stenosis or occlusion. Bilateral external iliac, common femoral, and proximal deep profunda and superficial femoral arteries are patent.   The visualized portal venous system demonstrates no significant abnormality.   CT CHEST:   MEDIASTINUM AND LYMPH NODES:  The esophageal wall appears within normal limits.  No enlarged intrathoracic or axillary lymph nodes by imaging criteria. No pneumomediastinum.   HEART: Normal size.  Moderate coronary artery calcifications. No significant pericardial effusion.   LUNG, PLEURA, LARGE AIRWAYS: Trachea and central airways are patent. No consolidation, pulmonary edema, pleural effusion or pneumothorax.   OSSEOUS STRUCTURES: No acute osseous abnormality.   CHEST WALL SOFT TISSUES: No discernible abnormality.     CT ABDOMEN/PELVIS:   ABDOMINAL WALL: No significant abnormality.   LIVER: No significant parenchymal abnormality.Stable 1.4 cm attenuating hypodensity in the right hepatic lobe (series 201, image 255), favoring to represent simple hepatic cyst. Additional subcentimeter hypodensities too small to characterize but favored to represent hepatic cysts.   BILE DUCTS: No significant intrahepatic or extrahepatic dilatation.   GALLBLADDER: No significant abnormality.   PANCREAS: No significant abnormality.   SPLEEN: No significant abnormality.   ADRENALS: No significant abnormality.   KIDNEYS, URETERS, BLADDER: Kidneys are normal in size and enhance symmetrically. Bilateral fluid attenuating renal hypodensities consistent simple renal cysts. A 1.4 cm left interpolar cyst (series 501, image 334) measuring higher  than water attenuation does not demonstrate enhancement and is likely a proteinaceous cyst. The subcentimeter hypodensities are too small to characterize. There is contrast within the renal collecting system on the noncontrast sequence limiting evaluation for renal stones. No evidence of hydroureteronephrosis.   REPRODUCTIVE ORGANS: Mild prostatomegaly. Otherwise, no significant abnormality.   VESSELS: (See above). No additional significant abnormality.   RETROPERITONEUM/LYMPH NODES: No enlarged lymph nodes. No acute retroperitoneal abnormality.   BOWEL/MESENTERY/PERITONEUM: Stomach is within normal limits. Severe diffuse colonic diverticulosis without evidence of acute diverticulitis. No inflammatory bowel wall thickening or dilatation. Normal appendix.   No significant ascites, free air, or fluid collection.   OSSEOUS STRUCTURES: No acute osseous abnormality.       1. Stable postsurgical changes of TEVAR with carotid subclavian bypass for type B aortic dissection. 2. Aneurysmal dilation of the distal descending thoracic aorta measuring up to 7 cm and stable since at least 09/22/2023. However, there is aneurysmal dilation of the aorta at the diaphragmatic hiatus measuring up to 5.7 cm slightly increased from 09/22/2023 when it measured 5.3 cm. 3. No acute process of the chest, abdomen, or pelvis. 4. Additional stable findings as discussed above.     I personally reviewed the images/study and I agree with the findings as stated by Molly Carrera DO, PGY-3. This study was interpreted at [facility], [place], [place].   MACRO: None.   Signed by: Jani Stevens 2/9/2025 9:08 PM Dictation workstation:   [id number]    ECG 12 lead    Result Date: 2/9/2025  Sinus rhythm with 1st degree AV block Septal infarct , age undetermined Abnormal ECG When compared with ECG of 31-JAN-2025 01:45, Septal infarct is now Present See ED provider note for full interpretation and clinical correlation  Confirmed by Clara Bar ([id number]) on 2/9/2025 7:54:28 PM    CT angio neck    Result Date: 2/9/2025  Interpreted By:  Pato Smith, STUDY: CT ANGIO NECK;  2/9/2025 4:11 pm   INDICATION: Signs/Symptoms:Recent left vertebral artery dissection on recent CT. Presents with persistent elevated BP and extremity paresthesias. Eval for progression of dissection..   COMPARISON: None.   ACCESSION NUMBER(S): [id number]   ORDERING CLINICIAN: CARRI ARRIAZA   TECHNIQUE: Following intravenous injection of contrast material, CT was acquired from the aortic arch to the vertex of the skull. Multiplanar reconstructions and 3D reconstructions were made.3D reconstructions, MIPs, Volume Rendered, or Surface Shading image were performed at a separate workstation   FINDINGS: Chest   There is extensive beam hardening artifact at the cervicothoracic junction due to the patient's body habitus and reflux contrast material within venous structures. Partial demonstration of aortic arch stent without evidence of endoleak or aortic dissection   Neck   Right carotid The common carotid artery is normal. The bifurcation is normal. The cervical, petrous and cavernous portions are normal.   Left carotid The common carotid artery is normal. The bifurcation is normal. The cervical, petrous and cavernous portions are normal.   Vertebrals   Vertebral arteries in the neck are asymmetrical with dominance of the right vertebral. The left vertebral artery is poorly demonstrated largely due to beam hardening artifact. On coronal reconstructed image 75/139, there is demonstration of normal left vertebral artery origin with tapered narrowing to occlusion or near occlusion at the cervicothoracic junction. There is distal opacification of the cervical portion of the left vertebral artery. The distal portions of the left vertebral artery, vertebrobasilar junction and basilar artery are normal. The vertebrobasilar junction and basilar artery are normal. The right  vertebral artery is normal.   Soft tissue neck     There is no evidence of mass, cyst or adenopathy within the neck   Intracranial   There is no evidence of aneurysm, vascular malformation or branch occlusion.       *There is continuous opacification with slight narrowing of the cervical portion of the left vertebral artery. *The right vertebral artery is normal *The bilateral carotid arteries are normal.   MACRO: none   Signed by: Pato Smith 2/9/2025 5:38 PM Dictation workstation:   [id number]    XR chest 1 view    Result Date: 2/9/2025  Interpreted By:  Tanvir Peters,  and Deandre Barnes STUDY: XR CHEST 1 VIEW;  2/9/2025 3:17 pm   INDICATION: Signs/Symptoms:HTN.     COMPARISON: Chest x-ray 07/06/2024   ACCESSION NUMBER(S): [id number]   ORDERING CLINICIAN: FRANKLIN THOMPSON   FINDINGS: AP radiograph of the chest was provided.   Redemonstration of aortic stent. Vascular coils are noted.   CARDIOMEDIASTINAL SILHOUETTE: Cardiomediastinal silhouette is normal in size and configuration.   LUNGS: Low lung volumes with bronchovascular crowding. Otherwise, no focal consolidation, pleural effusion, or pneumothorax.   ABDOMEN: No remarkable upper abdominal findings.   BONES: No acute osseous changes.       1.  No evidence of acute cardiopulmonary process.   I personally reviewed the images/study and I agree with the findings as stated by Molly Carrera DO, PGY-3. This study was interpreted at [facility], [place], [place].   MACRO: None   Signed by: Tanvir Peters 2/9/2025 5:23 PM Dictation workstation:   [id number]      ED Interventions:  Medications   enoxaparin (Lovenox) syringe 40 mg (40 mg subcutaneous Not Given 2/10/25 0016)   cloNIDine (Catapres) tablet 0.2 mg (0.2 mg oral Given 2/10/25 0014)   acetaminophen (Tylenol) tablet 975 mg ( oral Held by provider 2/9/25 0239)   labetalol (Normodyne) tablet 200 mg (200 mg oral Not Given 2/10/25 0158)   hydrALAZINE (Apresoline)  tablet 25 mg (has no administration in time range)   iohexol (OMNIPaque) 350 mg iodine/mL solution 150 mL (150 mL intravenous Given 2/9/25 1600)       Cardiac Hx:  Last echo: No results found for this or any previous visit.   Last cath:    Last EKG:   Encounter Date: 02/09/25   ECG 12 lead   Result Value    Ventricular Rate 96    Atrial Rate 96    MN Interval 218    QRS Duration 96    QT Interval 334    QTC Calculation(Bazett) 421    P Axis 72    R Axis 69    T Axis 4    QRS Count 15    Q Onset 223    P Onset 114    P Offset 180    T Offset 390    QTC Fredericia 390    Narrative    Sinus rhythm with 1st degree AV block  Septal infarct , age undetermined  Abnormal ECG  When compared with ECG of 31-JAN-2025 01:45,  Septal infarct is now Present      See ED provider note for full interpretation and clinical correlation  Confirmed by Clara Bar ([id number]) on 2/9/2025 7:54:28 PM        Past Medical History  Past Medical History:   Diagnosis Date    Anxiety disorder, unspecified 06/29/2020    Anxiety    Diverticulitis of intestine, part unspecified, without perforation or abscess without bleeding     Diverticulitis    Other allergy status, other than to drugs and biological substances     Environmental allergies    Other specified health status     No pertinent past surgical history    Personal history of other diseases of the circulatory system     History of cardiac murmur    Personal history of other diseases of the circulatory system 06/29/2020    History of hypertension       Surgical History  Past Surgical History:   Procedure Laterality Date    CT ABDOMEN PELVIS ANGIOGRAM W AND/OR WO IV CONTRAST  2/11/2018    CT ABDOMEN PELVIS ANGIOGRAM W AND/OR WO IV CONTRAST [facility]11/2018 [facility]    CT ABDOMEN PELVIS ANGIOGRAM W AND/OR WO IV CONTRAST  5/8/2018    CT ABDOMEN PELVIS ANGIOGRAM W AND/OR WO IV CONTRAST 5/8/2018 [facility]    CT ABDOMEN PELVIS ANGIOGRAM W AND/OR WO IV CONTRAST  2/12/2019    CT ABDOMEN  PELVIS ANGIOGRAM W AND/OR WO IV CONTRAST [place] [facility]    CT ABDOMEN PELVIS ANGIOGRAM W AND/OR WO IV CONTRAST  2/25/2019    CT ABDOMEN PELVIS ANGIOGRAM W AND/OR WO IV CONTRAST [place] [facility]    CT ABDOMEN PELVIS ANGIOGRAM W AND/OR WO IV CONTRAST  9/10/2020    CT ABDOMEN PELVIS ANGIOGRAM W AND/OR WO IV CONTRAST [place]10/2020 [facility]    CT ABDOMEN PELVIS ANGIOGRAM W AND/OR WO IV CONTRAST  2/9/2022    CT ABDOMEN PELVIS ANGIOGRAM W AND/OR WO IV CONTRAST 2/9/2022 [facility] CLINICAL LEGACY    CT ABDOMEN PELVIS ANGIOGRAM W AND/OR WO IV CONTRAST  4/19/2017    CT ABDOMEN PELVIS ANGIOGRAM W AND/OR WO IV CONTRAST [facility]    CT ABDOMEN PELVIS ANGIOGRAM W AND/OR WO IV CONTRAST  4/23/2017    CT ABDOMEN PELVIS ANGIOGRAM W AND/OR WO IV CONTRAST [facility]23[facility]2017 [facility]    CT ABDOMEN PELVIS ANGIOGRAM W AND/OR WO IV CONTRAST  6/2/2017    CT ABDOMEN PELVIS ANGIOGRAM W AND/OR WO IV CONTRAST [place] [facility] [place]    CT ABDOMEN PELVIS ANGIOGRAM W AND/OR WO IV CONTRAST  9/25/2017    CT ABDOMEN PELVIS ANGIOGRAM W AND/OR WO IV CONTRAST 9[place]25/2017 [facility]    CT ABDOMEN PELVIS ANGIOGRAM W AND/OR WO IV CONTRAST  12/25/2017    CT ABDOMEN PELVIS ANGIOGRAM W AND/OR WO IV CONTRAST 12[place]25/2017 [facility] [place]    CT ABDOMEN PELVIS ANGIOGRAM W AND/OR WO IV CONTRAST  12/28/2017    CT ABDOMEN PELVIS ANGIOGRAM W AND/OR WO IV CONTRAST 12/28/2017 [facility]    CT ABDOMEN PELVIS ANGIOGRAM W AND/OR WO IV CONTRAST  7/11/2018    CT ABDOMEN PELVIS ANGIOGRAM W AND/OR WO IV CONTRAST 7[place]11/2018 [facility]    CT ABDOMEN PELVIS ANGIOGRAM W AND/OR WO IV CONTRAST  12/1/2022    CT ABDOMEN PELVIS ANGIOGRAM W AND/OR WO IV CONTRAST [facility]    CT ABDOMEN PELVIS ANGIOGRAM W AND/OR WO IV CONTRAST  12/4/2022    CT ABDOMEN PELVIS ANGIOGRAM W AND/OR WO IV CONTRAST [facility]    CT ANGIO CORONARY ART WITH HEARTFLOW IF SCORE >30%   7/11/2018    CT HEART CORONARY ANGIOGRAM 7/11/2018 [facility]GENCY LEGAC[facility]    CT ANGIO NECK  9/8/2020    CT NECK ANGIO W AND WO IV CONTRAST 9/8/2020 [facility] ANCILLARY LEGACY    CT ANGIO NECK  1/20/2023    CT NECK ANGIO W AND WO IV CONTRAST 1/20/2023 [facility]    CT HEAD ANGIO W AND WO IV CONTRAST  1/20/2023    CT HEAD ANGIO W AND WO IV CONTRAST 1/20/2023 [facility]    INCISION AND DRAINAGE OF WOUND  10/26/2016    Incision And Drainage Of Skin Abscess    OTHER SURGICAL HISTORY  06/29/2020    Ascending aortic dissection repair       Social History  He reports that he has never smoked. He has never used smokeless tobacco. He reports that he does not drink alcohol and does not use drugs.    Family History  No family history on file.     Allergies  Ibuprofen and Carvedilol     Physical Exam  Constitutional:       General: He is not in acute distress.     Appearance: Normal appearance.   Eyes:      Extraocular Movements: Extraocular movements intact.      Conjunctiva/sclera: Conjunctivae normal.   Cardiovascular:      Rate and Rhythm: Normal rate and regular rhythm.      Pulses: Normal pulses.      Heart sounds: Normal heart sounds.   Pulmonary:      Effort: Pulmonary effort is normal.      Breath sounds: Normal breath sounds.   Abdominal:      General: Abdomen is flat. Bowel sounds are normal. There is no distension.      Palpations: Abdomen is soft.      Tenderness: There is no abdominal tenderness.   Musculoskeletal:         General: No swelling.      Right lower leg: No edema.      Left lower leg: No edema.   Neurological:      General: No focal deficit present.      Mental Status: He is alert and oriented to person, place, and time.      Cranial Nerves: No cranial nerve deficit.   Psychiatric:         Mood and Affect: Mood normal.         Behavior: Behavior normal.      Comments: Anxious         Medications  Home Meds  Prior to Admission medications    Medication Sig Start Date End Date Taking?  Authorizing Provider   cloNIDine (Catapres) 0.2 mg tablet Take 1 tablet (0.2 mg) by mouth 2 times a day.   Yes Historical Provider, MD   labetalol (Normodyne) 200 mg tablet Take 1 tablet (200 mg) by mouth 2 times a day.   Yes Historical Provider, MD   acetaminophen (Tylenol) 500 mg tablet Take 2 tablets (1,000 mg) by mouth every 6 hours if needed for mild pain (1 - 3) for up to 10 days. 1/31/25 2/10/25  Cheri Membreno DO   cyclobenzaprine (Flexeril) 10 mg tablet Take 1 tablet (10 mg) by mouth 2 times a day as needed for muscle spasms for up to 5 days.  Patient not taking: Reported on 2/9/2025 1/31/25 2/5/25  Cheri Membreno DO   famotidine (Pepcid) 20 mg tablet Take 1 tablet (20 mg) by mouth 2 times a day for 15 days.  Patient not taking: Reported on 2/9/2025 7/6/24 7/21/24  Radha Coulter DO   hydrOXYzine HCL (Atarax) 25 mg tablet Take 1 tablet (25 mg) by mouth every 6 hours for 3 days.  Patient not taking: Reported on 2/9/2025 7/6/24 7/9/24  Radha Coulter DO   lidocaine (Lidoderm) 5 % patch Place 1 patch over 12 hours on the skin once daily. Remove & discard patch within 12 hours or as directed by MD.  Patient not taking: Reported on 2/9/2025 1/31/25   Cheri Membreno DO     Scheduled medications  cloNIDine, 0.2 mg, oral, BID  enoxaparin, 40 mg, subcutaneous, q24h  hydrALAZINE, 25 mg, oral, TID  labetalol, 1 tablet, oral, BID      Continuous medications     PRN medications  PRN medications: [Held by provider] acetaminophen      Assessment/Plan   Sreekanth Greenfield is a 64 y.o. male with PMHx of type B aortic dissection sp TEVAR L carotid to L subclavian PTFE bypass (2017), PTSD, HTN who presented to ED after elevated BP of 320/180 via wrist monitor and admitted to CICU for asymptomatic hypertensive urgency. Patient reports having baseline elevated BP ranging from 180s to over 200 consistently via wrist BP monitor and reports being adherent to BP regimen. In ED, patient refused Hydral but took home  Labetalol per patient.  On arrival to CICU, patient with -210s, remains asymptomatic, and declining Hydralazine or any new medications because he is apprehensive of side effects. Provided information about Hydralazine usage and side effects per RN. Improvement in BP overnight to -180s overnight after taking home Clonidine.    NEUROLOGY/ PSYCH:  #Hx PTSD  -Not taking medications    CARDIOVASCULAR:  #Asymptomatic hypertensive urgency  #Type B aortic dissection s/p TEVAR L carotid to L subclavian PTFE bypass (2017)  #Aneurysmal dilation of distal descending thoracic aorta, 7cm and stable   #HTN  -BP >300 per wrist monitor per pt, in ED SBP 200s s/p 1x PO Labetalol per patient   -CTA on admission showing Stable postsurgical changes of TEVAR with carotid subclavian bypass for type B aortic dissection. Aneurysmal dilation of the distal descending thoracic aorta  measuring up to 7 cm and stable since at least 09/22/2023 however dilation of the aorta at the diaphragmatic hiatusmeasuring up to 5.7 cm slightly increased from 09/22/2023 when it measured 5.3 cm.  -Patient currently refusing Hydralazine due to concerns about side effects, information provided regarding uses/side effects though patient still hesitant and only requesting home medications   -s/p 1x Clonidine BP 0.2mg in CICU with reduction in BP 140s-180s  -Had previously been trialed on carvedilol, patient reports he could not tolerate this   -Continue home regimen and Hydral 25 mg TID if patient can tolerate this and if indicated       PULMONARY:  HUY      RENAL/ GENITOURINARY:  HUY      GASTROENTEROLOGY:  #Hx of rectal bleeding iso diverticulosis in 12/2022  -Per chart review last colonoscopies 2011+2012 for rectal bleeding showed colonic diverticula and 2 polyps which were removed, reports unavailable     ENDOCRINOLOGY:  HUY      RHEUMATOLOGY:  HUY      HEMATOLOGY:  HUY      MUSCULOSKELETAL/ SKIN:  HUY      INFECTIOUS DISEASE:  HUY      FLUID/  ELECTROLYTE/ NUTRITION:  Fluids: Replete PRN  Electrolytes: Keep mg >2, phos >3  and K >4  Nutrition:  Adult diet Regular   Antimicrobials: None  DVT PPX: Lovenox Subq 40  Bowel care:Miralax  Catheter:None  Lines:PIV  Oxygen:Room Air        Code Status: DNR and No Intubation (confirmed on admission)   NOK:  Primary Emergency Contact: Catrachita Greenfield - Brother [phone] and  Greg Greenfield (brother) [phone] and Marisol Greenfield (sister) [phone]       Melissa Andrea MD  Internal Medicine, PGY-2

## 2025-02-10 NOTE — CONSULTS
"    VASCULAR SURGERY CONSULT NOTE    Assessment/Plan   Sreekanth Greenfield is 64 y.o. male with history of PTSD,HTN, TBAD sp TEVAD and CSB in 2017 with PTFE. He presents today for hypertension to the 300s at home, 200s upon presentation ED. Vascular surgery is consulted for CTA revealing aneurysmal dilation of the diaphragmatic aorta to 5.8 cm from 5.3 cm in 9/22/2023.  He had a CTA completed in 1/31 which revealed the aortic hiatus aneurysm size at ~5.3 cm. When measuring the aortic aneurysm in the same plane, the aneurysm size is stable.    Plan:  Blood pressure goals to <140 (normotensive)    D/w attending, Dr. Dunphy and vascular surgery team    Socrates Gill DO PGY-1  General Surgery      Subjective   HPI:  Sreekanth Greenfield is 64 y.o. male with history of PTSD,HTN, TBAD sp TEVAD and CSB in 2017 with PTFE. He presents today for hypertension to the 300s at home, 200s upon presentation ED. Vascular surgery is consulted for CTA revealing aneurysmal dilation of the diaphragmatic aorta to 5.8 cm from 5.3 cm in 9/22/2023.      Of note he was seen with vascular surgery in 1/31/2025 when he presented for neck pain and was noted to have a finding of V2 dissection. Given his pain was thought to be msk in nature, this was felt to be an asymptomatic dissection for which the aptient was recommended to be managed with ASA 81 and admission to medical service for BP treatment. At the time, his aneurysm at the point of maximal dilation of ~5.3 cm.    He has history of unremitting hypertension for which he takes clonidine and labetalol. He dislikes taking his bp medications due to nausea but does endorse compliance with his BP medications. Home BP per patient range between 150 (\"on a good day\") and 190 (due to stress) and occasionally is above 200.       Vascular History:  TEVAR CSB    PMH: HTN,PTSD, TBAD    PSH: TEVAR, CSB 2017    Home Meds:  No current facility-administered medications on file prior to encounter.     Current " Outpatient Medications on File Prior to Encounter   Medication Sig Dispense Refill    cloNIDine (Catapres) 0.2 mg tablet Take 1 tablet (0.2 mg) by mouth 2 times a day.      labetalol (Normodyne) 200 mg tablet Take 1 tablet (200 mg) by mouth 2 times a day.      acetaminophen (Tylenol) 500 mg tablet Take 2 tablets (1,000 mg) by mouth every 6 hours if needed for mild pain (1 - 3) for up to 10 days. 30 tablet 0    cyclobenzaprine (Flexeril) 10 mg tablet Take 1 tablet (10 mg) by mouth 2 times a day as needed for muscle spasms for up to 5 days. (Patient not taking: Reported on 2/9/2025) 10 tablet 0    famotidine (Pepcid) 20 mg tablet Take 1 tablet (20 mg) by mouth 2 times a day for 15 days. (Patient not taking: Reported on 2/9/2025) 30 tablet 0    hydrOXYzine HCL (Atarax) 25 mg tablet Take 1 tablet (25 mg) by mouth every 6 hours for 3 days. (Patient not taking: Reported on 2/9/2025) 12 tablet 0    lidocaine (Lidoderm) 5 % patch Place 1 patch over 12 hours on the skin once daily. Remove & discard patch within 12 hours or as directed by MD. (Patient not taking: Reported on 2/9/2025) 5 patch 0        Allergies:  Allergies   Allergen Reactions    Ibuprofen Angioedema, Itching, Other and Shortness of breath     Sweating, angioedema, itching    Total body itching with dyspnea    Pt starts sweating, gets rash and has shortness of breath    Carvedilol Unknown       SH/FH:   Social History     Socioeconomic History    Marital status: Single   Tobacco Use    Smoking status: Never    Smokeless tobacco: Never   Vaping Use    Vaping status: Never Used   Substance and Sexual Activity    Alcohol use: Never    Drug use: Never     Social Drivers of Health     Financial Resource Strain: Low Risk  (2/10/2025)    Overall Financial Resource Strain (CARDIA)     Difficulty of Paying Living Expenses: Not very hard   Food Insecurity: No Food Insecurity (2/10/2025)    Hunger Vital Sign     Worried About Running Out of Food in the Last Year: Never  true     Ran Out of Food in the Last Year: Never true   Transportation Needs: No Transportation Needs (2/10/2025)    PRAPARE - Transportation     Lack of Transportation (Medical): No     Lack of Transportation (Non-Medical): No   Physical Activity: Sufficiently Active (2/9/2025)    Exercise Vital Sign     Days of Exercise per Week: 3 days     Minutes of Exercise per Session: 60 min   Intimate Partner Violence: Not At Risk (2/10/2025)    Humiliation, Afraid, Rape, and Kick questionnaire     Fear of Current or Ex-Partner: No     Emotionally Abused: No     Physically Abused: No     Sexually Abused: No   Housing Stability: Low Risk  (2/10/2025)    Housing Stability Vital Sign     Unable to Pay for Housing in the Last Year: No     Number of Times Moved in the Last Year: 0     Homeless in the Last Year: No        ROS: 12 system negative except HPI    Objective   Vitals:  Heart Rate:  []   Temp:  [36.2 °C (97.2 °F)-36.4 °C (97.5 °F)]   Resp:  [10-20]   BP: (147-218)/()   Height:  [182.9 cm (6')]   Weight:  [90.3 kg (199 lb)-90.7 kg (200 lb)]   SpO2:  [90 %-99 %]     Exam:  Constitutional: No acute distress, resting comfortably  Neuro:  AOx3, grossly intact. CNII-XII intact  ENMT: moist mucous membranes  Head/neck: atraumatic  CV: no tachycardia. Rates are well controlled int he 70s  Pulm: non-labored on room air  GI: soft, non-tender, mildly distended  Skin: warm and dry  Musculoskeletal: moving all extremities  Extremities: Palpable radials, palpable fems, palpable dp, palpable pt bilaterally. Intact strength and sensation in the bilateral upper and lower extremeties.    Labs:  Results from last 7 days   Lab Units 02/10/25  0103 02/09/25  1512   WBC AUTO x10*3/uL 6.7 6.5   HEMOGLOBIN g/dL 14.4 14.9   PLATELETS AUTO x10*3/uL 176 214      Results from last 7 days   Lab Units 02/10/25  0257 02/09/25  1512   SODIUM mmol/L 138 137   POTASSIUM mmol/L 4.1 3.7   CHLORIDE mmol/L 103 103   CO2 mmol/L 28 27   BUN mg/dL  12 18   CREATININE mg/dL 1.00 1.09   GLUCOSE mg/dL 126* 125*   MAGNESIUM mg/dL 2.03 2.02   PHOSPHORUS mg/dL 2.9  --       Results from last 7 days   Lab Units 02/09/25  1512   INR  1.0   PROTIME seconds 11.3   APTT seconds 34           Imaging:  Reviewed independently by vascular team:

## 2025-02-10 NOTE — PROGRESS NOTES
"Pharmacy Medication History Review    Sreekanth Greenfield is a 64 y.o. male admitted for Primary hypertension. Pharmacy reviewed the patient's mlwkz-vy-zdiyimdqa medications and allergies for accuracy.    Medications ADDED:  LABETALOL 200 MG   CLONIDINE 0.2 MG   Medications CHANGED:  NONE   Medications REMOVED:   CYCLOBENZAPRINE 10 MG   FAMOTIDINE 20 MG   HYDROXYZINE 25 MG   LIDOCAINE 5 % PATCH      The list below reflects the updated PTA list.   Prior to Admission Medications   Prescriptions Last Dose Informant   acetaminophen (Tylenol) 500 mg tablet Unknown Self   Sig: Take 2 tablets (1,000 mg) by mouth every 6 hours if needed for mild pain (1 - 3) for up to 10 days.   cloNIDine (Catapres) 0.2 mg tablet 2/9/2025 Self   Sig: Take 1 tablet (0.2 mg) by mouth 2 times a day.   labetalol (Normodyne) 200 mg tablet 2/9/2025 Self   Sig: Take 1 tablet (200 mg) by mouth 2 times a day.      Facility-Administered Medications: None        The list below reflects the updated allergy list. Please review each documented allergy for additional clarification and justification.  Allergies  Reviewed by Westley Trotter on 2/9/2025        Severity Reactions Comments    Ibuprofen High Angioedema, Itching, Other, Shortness of breath Sweating, angioedema, itching Total body itching with dyspnea Pt starts sweating, gets rash and has shortness of breath    Carvedilol Not Specified Unknown             Patient declines M2B at discharge.     Sources:   Patient interview  Epic dispense history  Epic allergy list   OARRS ( NONE )    Additional Comments:  Patient knows medication name dose and frequency       Westley Trotter  Pharmacy Technician  02/09/25     Secure Chat preferred   If no response call [facility] or [facility] \"Med Rec\"   "

## 2025-02-10 NOTE — CARE PLAN
Problem: Pain - Adult  Goal: Verbalizes/displays adequate comfort level or baseline comfort level  Outcome: Progressing     Problem: Safety - Adult  Goal: Free from fall injury  Outcome: Progressing     Problem: Discharge Planning  Goal: Discharge to home or other facility with appropriate resources  Outcome: Progressing     Problem: Chronic Conditions and Co-morbidities  Goal: Patient's chronic conditions and co-morbidity symptoms are monitored and maintained or improved  Outcome: Progressing     Problem: Nutrition  Goal: Nutrient intake appropriate for maintaining nutritional needs  Outcome: Progressing

## 2025-02-11 LAB
ALBUMIN SERPL BCP-MCNC: 3.9 G/DL (ref 3.4–5)
ALBUMIN SERPL BCP-MCNC: 4.1 G/DL (ref 3.4–5)
ANION GAP SERPL CALC-SCNC: 12 MMOL/L (ref 10–20)
ANION GAP SERPL CALC-SCNC: 13 MMOL/L (ref 10–20)
BASOPHILS # BLD AUTO: 0.04 X10*3/UL (ref 0–0.1)
BASOPHILS # BLD AUTO: 0.05 X10*3/UL (ref 0–0.1)
BASOPHILS NFR BLD AUTO: 0.6 %
BASOPHILS NFR BLD AUTO: 0.8 %
BUN SERPL-MCNC: 15 MG/DL (ref 6–23)
BUN SERPL-MCNC: 15 MG/DL (ref 6–23)
CALCIUM SERPL-MCNC: 9.5 MG/DL (ref 8.6–10.6)
CALCIUM SERPL-MCNC: 9.6 MG/DL (ref 8.6–10.6)
CHLORIDE SERPL-SCNC: 102 MMOL/L (ref 98–107)
CHLORIDE SERPL-SCNC: 104 MMOL/L (ref 98–107)
CO2 SERPL-SCNC: 27 MMOL/L (ref 21–32)
CO2 SERPL-SCNC: 29 MMOL/L (ref 21–32)
CREAT SERPL-MCNC: 1.03 MG/DL (ref 0.5–1.3)
CREAT SERPL-MCNC: 1.08 MG/DL (ref 0.5–1.3)
EGFRCR SERPLBLD CKD-EPI 2021: 77 ML/MIN/1.73M*2
EGFRCR SERPLBLD CKD-EPI 2021: 82 ML/MIN/1.73M*2
EOSINOPHIL # BLD AUTO: 0.19 X10*3/UL (ref 0–0.7)
EOSINOPHIL # BLD AUTO: 0.19 X10*3/UL (ref 0–0.7)
EOSINOPHIL NFR BLD AUTO: 2.9 %
EOSINOPHIL NFR BLD AUTO: 2.9 %
ERYTHROCYTE [DISTWIDTH] IN BLOOD BY AUTOMATED COUNT: 11.2 % (ref 11.5–14.5)
ERYTHROCYTE [DISTWIDTH] IN BLOOD BY AUTOMATED COUNT: 11.5 % (ref 11.5–14.5)
GLUCOSE SERPL-MCNC: 108 MG/DL (ref 74–99)
GLUCOSE SERPL-MCNC: 118 MG/DL (ref 74–99)
HCT VFR BLD AUTO: 39 % (ref 41–52)
HCT VFR BLD AUTO: 42 % (ref 41–52)
HGB BLD-MCNC: 14.4 G/DL (ref 13.5–17.5)
HGB BLD-MCNC: 14.8 G/DL (ref 13.5–17.5)
IMM GRANULOCYTES # BLD AUTO: 0.02 X10*3/UL (ref 0–0.7)
IMM GRANULOCYTES # BLD AUTO: 0.02 X10*3/UL (ref 0–0.7)
IMM GRANULOCYTES NFR BLD AUTO: 0.3 % (ref 0–0.9)
IMM GRANULOCYTES NFR BLD AUTO: 0.3 % (ref 0–0.9)
LYMPHOCYTES # BLD AUTO: 1.8 X10*3/UL (ref 1.2–4.8)
LYMPHOCYTES # BLD AUTO: 2.15 X10*3/UL (ref 1.2–4.8)
LYMPHOCYTES NFR BLD AUTO: 27.3 %
LYMPHOCYTES NFR BLD AUTO: 32.7 %
MAGNESIUM SERPL-MCNC: 2.14 MG/DL (ref 1.6–2.4)
MAGNESIUM SERPL-MCNC: 2.15 MG/DL (ref 1.6–2.4)
MCH RBC QN AUTO: 32 PG (ref 26–34)
MCH RBC QN AUTO: 32.1 PG (ref 26–34)
MCHC RBC AUTO-ENTMCNC: 35.2 G/DL (ref 32–36)
MCHC RBC AUTO-ENTMCNC: 36.9 G/DL (ref 32–36)
MCV RBC AUTO: 87 FL (ref 80–100)
MCV RBC AUTO: 91 FL (ref 80–100)
MONOCYTES # BLD AUTO: 0.53 X10*3/UL (ref 0.1–1)
MONOCYTES # BLD AUTO: 0.56 X10*3/UL (ref 0.1–1)
MONOCYTES NFR BLD AUTO: 8.1 %
MONOCYTES NFR BLD AUTO: 8.5 %
NEUTROPHILS # BLD AUTO: 3.64 X10*3/UL (ref 1.2–7.7)
NEUTROPHILS # BLD AUTO: 3.99 X10*3/UL (ref 1.2–7.7)
NEUTROPHILS NFR BLD AUTO: 55.2 %
NEUTROPHILS NFR BLD AUTO: 60.4 %
NRBC BLD-RTO: 0 /100 WBCS (ref 0–0)
NRBC BLD-RTO: 0 /100 WBCS (ref 0–0)
PHOSPHATE SERPL-MCNC: 3.2 MG/DL (ref 2.5–4.9)
PHOSPHATE SERPL-MCNC: 3.7 MG/DL (ref 2.5–4.9)
PLATELET # BLD AUTO: 181 X10*3/UL (ref 150–450)
PLATELET # BLD AUTO: 188 X10*3/UL (ref 150–450)
POTASSIUM SERPL-SCNC: 3.7 MMOL/L (ref 3.5–5.3)
POTASSIUM SERPL-SCNC: 3.7 MMOL/L (ref 3.5–5.3)
RBC # BLD AUTO: 4.49 X10*6/UL (ref 4.5–5.9)
RBC # BLD AUTO: 4.62 X10*6/UL (ref 4.5–5.9)
SODIUM SERPL-SCNC: 139 MMOL/L (ref 136–145)
SODIUM SERPL-SCNC: 140 MMOL/L (ref 136–145)
WBC # BLD AUTO: 6.6 X10*3/UL (ref 4.4–11.3)
WBC # BLD AUTO: 6.6 X10*3/UL (ref 4.4–11.3)

## 2025-02-11 PROCEDURE — 99291 CRITICAL CARE FIRST HOUR: CPT

## 2025-02-11 PROCEDURE — 99222 1ST HOSP IP/OBS MODERATE 55: CPT | Performed by: STUDENT IN AN ORGANIZED HEALTH CARE EDUCATION/TRAINING PROGRAM

## 2025-02-11 PROCEDURE — 36415 COLL VENOUS BLD VENIPUNCTURE: CPT

## 2025-02-11 PROCEDURE — 2500000001 HC RX 250 WO HCPCS SELF ADMINISTERED DRUGS (ALT 637 FOR MEDICARE OP): Performed by: NURSE PRACTITIONER

## 2025-02-11 PROCEDURE — 2500000001 HC RX 250 WO HCPCS SELF ADMINISTERED DRUGS (ALT 637 FOR MEDICARE OP)

## 2025-02-11 PROCEDURE — 83735 ASSAY OF MAGNESIUM: CPT

## 2025-02-11 PROCEDURE — 80069 RENAL FUNCTION PANEL: CPT

## 2025-02-11 PROCEDURE — 1200000002 HC GENERAL ROOM WITH TELEMETRY DAILY

## 2025-02-11 PROCEDURE — 84100 ASSAY OF PHOSPHORUS: CPT

## 2025-02-11 PROCEDURE — 85025 COMPLETE CBC W/AUTO DIFF WBC: CPT

## 2025-02-11 RX ORDER — ACETAMINOPHEN 325 MG/1
650 TABLET ORAL EVERY 6 HOURS PRN
Status: DISCONTINUED | OUTPATIENT
Start: 2025-02-11 | End: 2025-02-12 | Stop reason: HOSPADM

## 2025-02-11 RX ORDER — TALC
3 POWDER (GRAM) TOPICAL NIGHTLY PRN
Status: DISCONTINUED | OUTPATIENT
Start: 2025-02-11 | End: 2025-02-12 | Stop reason: HOSPADM

## 2025-02-11 RX ORDER — POTASSIUM CHLORIDE 1.5 G/1.58G
40 POWDER, FOR SOLUTION ORAL ONCE
Status: COMPLETED | OUTPATIENT
Start: 2025-02-11 | End: 2025-02-11

## 2025-02-11 RX ADMIN — LABETALOL HYDROCHLORIDE 200 MG: 200 TABLET, FILM COATED ORAL at 05:17

## 2025-02-11 RX ADMIN — CLONIDINE HYDROCHLORIDE 0.2 MG: 0.1 TABLET ORAL at 00:00

## 2025-02-11 RX ADMIN — POTASSIUM CHLORIDE 40 MEQ: 1.5 POWDER, FOR SOLUTION ORAL at 06:45

## 2025-02-11 RX ADMIN — LABETALOL HYDROCHLORIDE 200 MG: 200 TABLET, FILM COATED ORAL at 13:03

## 2025-02-11 RX ADMIN — LABETALOL HYDROCHLORIDE 200 MG: 200 TABLET, FILM COATED ORAL at 21:02

## 2025-02-11 RX ADMIN — CLONIDINE HYDROCHLORIDE 0.2 MG: 0.1 TABLET ORAL at 21:02

## 2025-02-11 ASSESSMENT — PAIN - FUNCTIONAL ASSESSMENT
PAIN_FUNCTIONAL_ASSESSMENT: 0-10

## 2025-02-11 ASSESSMENT — PAIN SCALES - GENERAL
PAINLEVEL_OUTOF10: 0 - NO PAIN
PAINLEVEL_OUTOF10: 0 - NO PAIN

## 2025-02-11 ASSESSMENT — ACTIVITIES OF DAILY LIVING (ADL): LACK_OF_TRANSPORTATION: NO

## 2025-02-11 NOTE — HOSPITAL COURSE
"Sreekanth Greenfield is a 64 y.o. male with PMHx of type B aortic dissection sp TEVAR L carotid to L subclavian PTFE bypass (2017), PTSD, HTN who presented to ED after elevated BP of 320/180 via wrist monitor and admitted to CICU for asymptomatic hypertensive urgency.      Patient reports that he was doing his home BP check via BP wrist monitor and was \"over 300\" and came to the ED. He states that he is adherent with home HTN regimen of clonidine 0.2 mg BID and labetalol 200 mg BID. Otherwise asymptomatic but notes that his SBP runs around 180-250 typically.    Vascular surgery consulted given increased aneurysmal dilatation of aorta to 5.7 cm at the diaphragmatic hiatus. Recommended no acute inventions at this time as it appeared stable on their review of imaging. Also recommended follow-up as previously scheduled outpatient with Dr. Chu on 2/26/2025 and blood pressure control to SBP <140.     Blood pressures while in the CICU ranged around the 150s-180s systolic after restarting home medications and increasing labetalol to TID dosing. Patient reports that he has \"bad side effects to medications\" and \"I get all the bad side effects\"; referring to having skin rashes and nausea in the past to several medications including carvedilol. He states he is very hesitant to take any new medications because of side effects and assisting him taking only home blood pressure medications to see if this will improve hypertension. Despite multiple sessions of counseling/education with providers and pharmacists, patient is very resistant to the idea of taking additional antihypertensives while inpatient. He is amenable to starting a new medication as an outpatient. Patient determined to have capacity as charted in significant event note on 2/10.    Patient transferred to floor/HVI service on 2/11 for further management.    Patient agreeable to increase Labetalol to 400 mg TID and start as outpatient.    Prescription sent to patients " preferred pharmacy. Patient denied having any other home going needs.      Upon review of medications, lab values, and vital signs patient was deemed stable for discharge in satisfactory condition.     Discharge weight: 90.5 kg     More than 60 minutes were spent in coordinating patient discharge.

## 2025-02-11 NOTE — CARE PLAN
The clinical goals for the shift include to remain HDS    Over the shift, the patient did not make progress toward the following goals. Pt unable to meet BP goal of systolic BP below 140. Pt refusing to take any additional blood pressure medicine. RN provided first dose education and non pharmacologic measures to help ease pt's anxiety. CICU team aware of pt's blood pressure and inability to maintain goal parameters.     Problem: Chronic Conditions and Co-morbidities  Goal: Patient's chronic conditions and co-morbidity symptoms are monitored and maintained or improved  2/11/2025 0118 by Reina Lee RN  Outcome: Not Progressing  Flowsheets (Taken 2/11/2025 0118)  Care Plan - Patient's Chronic Conditions and Co-Morbidity Symptoms are Monitored and Maintained or Improved:   Collaborate with multidisciplinary team to address chronic and comorbid conditions and prevent exacerbation or deterioration   Update acute care plan with appropriate goals if chronic or comorbid symptoms are exacerbated and prevent overall improvement and discharge  2/11/2025 0118 by Reina Lee RN  Outcome: Not Progressing  Flowsheets (Taken 2/11/2025 0118)  Care Plan - Patient's Chronic Conditions and Co-Morbidity Symptoms are Monitored and Maintained or Improved:   Collaborate with multidisciplinary team to address chronic and comorbid conditions and prevent exacerbation or deterioration   Update acute care plan with appropriate goals if chronic or comorbid symptoms are exacerbated and prevent overall improvement and discharge

## 2025-02-11 NOTE — CARE PLAN
The patient's goals for the shift include      The clinical goals for the shift include to remain HDS    Over the shift, the patient did make progress toward the following goals. Barriers to progression include hypertensive. Recommendations to address these barriers include med mgmt--but patient refusing new meds.

## 2025-02-11 NOTE — PROGRESS NOTES
" ICU to [facility] Transfer Summary     I:  ICU Admission Reason & Brief ICU Course:      Sreekanth Greenfield is a 64 y.o. male with PMHx of type B aortic dissection sp TEVAR L carotid to L subclavian PTFE bypass (2017), PTSD, HTN who presented to ED after elevated BP of 320/180 via wrist monitor and admitted to CICU for asymptomatic hypertensive urgency.      Patient reports that he was doing his home BP check via BP wrist monitor and was \"over 300\" and came to the ED. He states that he is adherent with home HTN regimen of clonidine 0.2 mg BID and labetalol 200 mg BID. He reported adherence to medications. Otherwise asymptomatic but notes that his SBP runs around 180-250 typically.    Vascular surgery consulted given increased aneurysmal dilatation of aorta to 5.7 cm at the diaphragmatic hiatus. Recommended no acute inventions at this time as it appeared stable on their review of imaging. Also recommended follow-up as previously scheduled outpatient with Dr. Chu on 2/26/2025 and blood pressure control to SBP <140.     Blood pressures while in the CICU have ranged around the 150s-180s systolic after restarting home medications and increasing labetalol to TID dosing. Patient reports that he has \"bad side effects to medications\" and \"I get all the bad side effects\"; referring to having skin rashes and nausea in the past to several medications including carvedilol. He states he is very hesitant to take any new medications because of side effects and assisting him taking only home blood pressure medications to see if this will improve hypertension. Despite multiple sessions of counseling/education with providers and pharmacists, patient is very resistant to the idea of taking additional antihypertensives while inpatient. He is amenable to starting a new medication as an outpatient. Patient determined to have capacity as charted in significant event note on 2/10.    C: Code Status/DPOA Info/Goals of Care/ACP Note    DNR and No " Intubation  DPOA/Contact Number: Extended Emergency Contact Information  Primary Emergency Contact: Catrachita Greenfield  Mobile Phone: [phone]  Relation: Brother  Preferred language: English   needed? No  Secondary Emergency Contact: Mandi Greenfield  Home Phone: [phone]  Mobile Phone: [phone]  Relation: Sister     U: Unprescribing & Pertinent High-Risk Medications    Changes to home meds: Labetalol increased to 200 mg TID from BID     Anticoagulation: Yes - SCDs, Lovenox    Antibiotics:   [x] N/A - no current planned antimicrobioals    P: Pending Tests at the Time of Transfer   None    A: Active consultants, including Rehab:   [x]  Subspecialty Consultants:  Vascular surgery  []  PT  []  OT  []  SLP  []  Wound Care    U: Uncertainty Measure/Diagnostic Pause:    Working diagnosis at the time of transfer: Symptomatic hypertensive urgency in the setting of stable aortic aneurysm     Diagnosis Degree of Certainty: 1. High degree of certainty about the clinical diagnosis.     S: Summary of Major Problems and To-Dos:   NEUROLOGY/ PSYCH:  #Hx PTSD  :: Not taking medications  Continue to monitor        CARDIOVASCULAR:  #Asymptomatic hypertensive urgency  #Type B aortic dissection s/p TEVAR L carotid to L subclavian PTFE bypass (2017)  #Aneurysmal dilation of distal descending thoracic aorta, 7cm and stable   #HTN  :: BP >300 per wrist monitor per pt before admission, in ED SBP 200s s/p 1x PO Labetalol per patient   :: CTA on admission showing stable postsurgical changes of TEVAR with carotid subclavian bypass for type B aortic dissection. Aneurysmal dilation of the distal descending thoracic aorta measuring up to 7 cm and stable since at least 09/22/2023 however dilation of the aorta at the diaphragmatic hiatus measuring up to 5.7 cm slightly increased from 09/22/2023 when it measured 5.3 cm.  :: Patient currently refusing additional antihypertensives due to concerns about side effects, information  provided regarding uses/side effects though patient still hesitant and only requesting home medications   :: Had previously been trialed on carvedilol, patient reports he could not tolerate this   Continue home regimen clonidine 0.2 mg BID and labetalol 200 mg TID (increased from BID).  Consulted vascular surgery regarding increased dilation of aorta to 5.7 cm at diaphragmatic hiatus. Recommending SBP< 140.  Pharmacy educated patient regarding additional antihypertensives. Patient still not amenable to adjusting regimen.        PULMONARY:  HUY        RENAL/ GENITOURINARY:  HUY        GASTROENTEROLOGY:  #Hx of rectal bleeding iso diverticulosis in 12/2022  :: Per chart review last colonoscopies 2011+2012 for rectal bleeding showed colonic diverticula and 2 polyps which were removed, reports unavailable        ENDOCRINOLOGY:  HUY        RHEUMATOLOGY:  HUY        HEMATOLOGY:  HUY        MUSCULOSKELETAL/ SKIN:  HUY        INFECTIOUS DISEASE:  HUY     IVF: PRN  Electrolytes: PRN  Access: PIV     Diet: Adult diet Regular  DVT Ppx: Lovenox  GI Ppx: Not indicated  Bowel regimen: MiraLAX daily PRN  Pain management: Not indicated     Emergency Contact: Extended Emergency Contact Information  Primary Emergency Contact: Jean PierreEloisasilvia  Mobile Phone: [phone]  Relation: Brother  Preferred language: English   needed? No  Secondary Emergency Contact: Mandi Greenfield  Home Phone: [phone]  Mobile Phone: [phone]  Relation: Sister  Code status: DNR and No Intubation (confirmed on admission)  PT/OT: Not indicated  Disposition: Inpatient pending further monitoring     To-do list:  [ ] Patient amenable to starting new antihypertensive at home only. Our team (with pharmacy) discussed nifedipine, spironolactone, and hydrochlorothiazide. Would recommend further discussion on the floor and starting one of these medications as outpatient.  [ ] Monitor SBP for goal <140. Current regimen clonidine 0.2 mg BID and  labetalol 200 mg TID.  [ ] Patient states that he would like to leave tomorrow AM (2/12).     E: Exam, including Lines/Drains/Airways & Data Review:   Physical Exam  Constitutional:       General: He is not in acute distress.     Appearance: Normal appearance. He is not ill-appearing.   HENT:      Head: Normocephalic and atraumatic.   Cardiovascular:      Rate and Rhythm: Normal rate and regular rhythm.      Pulses: Normal pulses.      Heart sounds: Normal heart sounds. No murmur heard.     No friction rub. No gallop.   Pulmonary:      Effort: Pulmonary effort is normal. No respiratory distress.      Breath sounds: Normal breath sounds. No wheezing.   Abdominal:      General: Abdomen is flat. There is no distension.      Palpations: Abdomen is soft. There is no mass.      Tenderness: There is no abdominal tenderness. There is no guarding or rebound.   Musculoskeletal:         General: No swelling or deformity. Normal range of motion.   Skin:     General: Skin is warm and dry.   Neurological:      General: No focal deficit present.      Mental Status: He is alert and oriented to person, place, and time. Mental status is at baseline.   Psychiatric:         Mood and Affect: Mood normal.         Behavior: Behavior normal.        Difficult airway? N/A  Lines/drains assessed for removal? No    Within 30 minutes of the patient physically leaving the floor, a Floor Readiness Note needs to be placed with updated vitals.

## 2025-02-11 NOTE — SIGNIFICANT EVENT
Patient transferred to the floor in stable condition to the hospitals Cardiology service. Upon evaluation, patient is HDS and in no acute distress with no c/o pain or discomfort. Based on chart review and physical examination, patient is appropriate for the hospitals floor service at this time. Transfer orders reviewed and updated. Plan of care as stated in transfer note.    To be seen and discussed with staff attending Dr. Sahu in the AM.

## 2025-02-11 NOTE — PROGRESS NOTES
02/11/25 1349   Discharge Planning   Living Arrangements Alone   Support Systems Family members   Assistance Needed none   Type of Residence Private residence   Number of Stairs to Enter Residence 0   Number of Stairs Within Residence 0   Do you have animals or pets at home? No   Who is requesting discharge planning? Provider   Home or Post Acute Services None   Expected Discharge Disposition Home   Does the patient need discharge transport arranged? No   Financial Resource Strain   How hard is it for you to pay for the very basics like food, housing, medical care, and heating? Not very   Housing Stability   In the last 12 months, was there a time when you were not able to pay the mortgage or rent on time? N   In the past 12 months, how many times have you moved where you were living? 1   At any time in the past 12 months, were you homeless or living in a shelter (including now)? N   Transportation Needs   In the past 12 months, has lack of transportation kept you from medical appointments or from getting medications? no   In the past 12 months, has lack of transportation kept you from meetings, work, or from getting things needed for daily living? No   Patient Choice   Patient / Family choosing to utilize agency / facility established prior to hospitalization No   Stroke Family Assessment   Stroke Family Assessment Needed No     Transitional Care Coordination Progress Note:  Patient discussed during interdisciplinary rounds.   Team members present: RN KAYLEIGH DEVI MD   Plan per Medical/Surgical team: further workup of aortic dissection   Payor: [employer] COMPLETE   Discharge disposition: Home  Potential Barriers: None   ADOD: 2-     Previous Home Care: None   DME: None   Pharmacy: [facility]   Falls: None   PCP:  YOLANDA PITTMAN  Dialysis: None

## 2025-02-11 NOTE — PROGRESS NOTES
VASCULAR SURGERY PROGRESS NOTE  Assessment/Plan   Sreekanth Greenfield is 64 y.o. male with history of PTSD,HTN, TBAD sp TEVAD and CSB in 2017 with PTFE. He presents today for hypertension to the 300s at home, 200s upon presentation ED. Vascular surgery is consulted for CTA revealing aneurysmal dilation of the diaphragmatic aorta to 5.8 cm from 5.3 cm in 9/22/2023.  He had a CTA completed in 1/31 which revealed the aortic hiatus aneurysm size at ~5.3 cm. When measuring the aortic aneurysm in the same plane with the same angle, the aneurysm size is stable.     Plan:  Maintain normotension (SBP <140)    From our review of the imaging, this patient's aneurysm has remained stable in size and does not require any acute intervention right now. He does have follow-up with vascular surgery scheduled on 2/26 as an outpatient.    Please reach out to vascular surgery with any questions.    D/w attending, Dr. Dunphy during AM report    Socrates Gill DO PGY-1  General Surgery    Subjective   NAEO. Had better Bps overnight but BP was 150s when in room. Patient endorses difficulty with compliance to anti-hypertensive medication.    Objective   Vitals:  Heart Rate:  [68-95]   Temp:  [36.4 °C (97.5 °F)-37.1 °C (98.8 °F)]   Resp:  [12-24]   BP: (105-203)/()   Weight:  [91.2 kg (201 lb)]   SpO2:  [91 %-100 %]     Exam:  Constitutional: No acute distress, resting comfortably  Neuro:  AOx3, grossly intact. CNII-XII intact  ENMT: moist mucous membranes  Head/neck: atraumatic  CV: no tachycardia. Rates are well controlled int he 70s  Pulm: non-labored on room air  GI: soft, non-tender, mildly distended  Skin: warm and dry  Musculoskeletal: moving all extremities  Extremities: Palpable radials, palpable fems, palpable dp, palpable pt bilaterally. Intact strength and sensation in the bilateral upper and lower extremeties.    Labs:  Results from last 7 days   Lab Units 02/11/25  0405 02/10/25  0103 02/09/25  1512   WBC AUTO x10*3/uL 6.6  6.7 6.5   HEMOGLOBIN g/dL 14.4 14.4 14.9   PLATELETS AUTO x10*3/uL 181 176 214      Results from last 7 days   Lab Units 02/11/25  0405 02/10/25  0257 02/09/25  1512 02/09/25  1512   SODIUM mmol/L 139 138  --  137   POTASSIUM mmol/L 3.7 4.1  --  3.7   CHLORIDE mmol/L 102 103  --  103   CO2 mmol/L 29 28  --  27   BUN mg/dL 15 12  --  18   CREATININE mg/dL 1.03 1.00  --  1.09   GLUCOSE mg/dL 108* 126*  --  125*   MAGNESIUM mg/dL 2.15 2.03  --  2.02   PHOSPHORUS mg/dL 3.7 2.9   < >  --     < > = values in this interval not displayed.      Results from last 7 days   Lab Units 02/09/25  1512   INR  1.0   PROTIME seconds 11.3   APTT seconds 34

## 2025-02-11 NOTE — SIGNIFICANT EVENT
Floor Readiness Note       I, personally, evaluated Sreekanth Greenfield prior to transfer to the floor, including reviewing all current laboratory and imaging studies. The patient remains appropriate for transfer to the floor. Bedside nurse and respiratory therapy are also in agreement of patient's readiness for the floor.     Brief summary:  Sreekanth [place] a 64 y.o. male who was admitted to the CICU on 2/9/25 for asymptomatic hypertensive urgency. They have been treated by restarting home antihypertensives and increasing labetalol dosing from BID to TID.     Updated focused Physical Exam:  Physical Exam  Constitutional:       General: He is not in acute distress.     Appearance: Normal appearance. He is not ill-appearing.   HENT:      Head: Normocephalic and atraumatic.   Cardiovascular:      Rate and Rhythm: Normal rate and regular rhythm.      Pulses: Normal pulses.      Heart sounds: Normal heart sounds. No murmur heard.     No friction rub. No gallop.   Pulmonary:      Effort: Pulmonary effort is normal. No respiratory distress.      Breath sounds: Normal breath sounds. No wheezing.   Abdominal:      General: Abdomen is flat. There is no distension.      Palpations: Abdomen is soft. There is no mass.      Tenderness: There is no abdominal tenderness. There is no guarding or rebound.   Musculoskeletal:         General: No swelling or deformity. Normal range of motion.   Skin:     General: Skin is warm and dry.   Neurological:      General: No focal deficit present.      Mental Status: He is alert and oriented to person, place, and time. Mental status is at baseline.   Psychiatric:         Mood and Affect: Mood normal.         Behavior: Behavior normal.         Current Vital Signs:  Heart Rate: 71 (02/11/25 1000 : Giovana Patiño RN)  BP: (!) 166/113 (02/11/25 1000 : Giovana Patiño RN)  Temp: 37 °C (98.6 °F) (02/11/25 0800 : Giovana Patiño RN)  Resp: 17 (02/11/25 1000 : Giovana Youssef  Haroon, RN)  SpO2: 100 % (02/11/25 0800 : Giovana Patiño, RN)    Relevant updates since rounds:  None    Accepting team, [facility]VI, received verbal sign out and the Provider Care team/Attending has been updated. Bedside nurse will now call accepting nurse for report and patient will be transferred to [place] [id number].    Jose Raul Nunn MD

## 2025-02-11 NOTE — PROGRESS NOTES
Daily Progress Note    Sreekanth Greenfield is a 64 y.o. male on day 1 of admission presenting with Primary hypertension.    Subjective   No acute overnight events. Patient seen and examined at bedside. Patient denies CP/SOB, N/V/D. Expressed concern with continued elevated blood pressures.    SBP ranging 105-161 overnight. Labetalol increased to 200 mg TID.       Objective   Vitals: I/O:   Vitals:    02/11/25 0600   BP: (!) 155/105   Pulse: 75   Resp: 18   Temp:    SpO2: 99%        Temp  Min: 36.4 °C (97.5 °F)  Max: 37.1 °C (98.8 °F)  Pulse  Min: 68  Max: 95  BP  Min: 105/78  Max: 203/145  Resp  Min: 12  Max: 24  SpO2  Min: 90 %  Max: 100 %   Intake/Output Summary (Last 24 hours) at 2/11/2025 0710  Last data filed at 2/10/2025 0900  Gross per 24 hour   Intake --   Output 300 ml   Net -300 ml        Net IO Since Admission: -1,050 mL [02/11/25 0710]      Physical Exam  Constitutional:       General: He is not in acute distress.     Appearance: Normal appearance. He is not ill-appearing.   HENT:      Head: Normocephalic and atraumatic.   Cardiovascular:      Rate and Rhythm: Normal rate and regular rhythm.      Pulses: Normal pulses.      Heart sounds: Normal heart sounds. No murmur heard.     No friction rub. No gallop.   Pulmonary:      Effort: Pulmonary effort is normal. No respiratory distress.      Breath sounds: Normal breath sounds. No wheezing.   Abdominal:      General: Abdomen is flat. There is no distension.      Palpations: Abdomen is soft. There is no mass.      Tenderness: There is no abdominal tenderness. There is no guarding or rebound.   Musculoskeletal:         General: No swelling or deformity. Normal range of motion.   Skin:     General: Skin is warm and dry.   Neurological:      General: No focal deficit present.      Mental Status: He is alert and oriented to person, place, and time. Mental status is at baseline.   Psychiatric:         Mood and Affect: Mood normal.         Behavior: Behavior normal.          Medications:  Scheduled Medications PRN Medications   cloNIDine, 0.2 mg, oral, BID  enoxaparin, 40 mg, subcutaneous, q24h  labetalol, 1 tablet, oral, q8h      PRN medications: [Held by provider] acetaminophen, polyethylene glycol      Continuous medications       Relevant Results:  Results from last 7 days   Lab Units 02/11/25  0405 02/10/25  0103 02/09/25  1512   WBC AUTO x10*3/uL 6.6 6.7 6.5   HEMOGLOBIN g/dL 14.4 14.4 14.9   HEMATOCRIT % 39.0* 39.4* 40.7*   PLATELETS AUTO x10*3/uL 181 176 214            Results from last 7 days   Lab Units 02/11/25  0405 02/10/25  0257 02/09/25  1512   SODIUM mmol/L 139 138 137   POTASSIUM mmol/L 3.7 4.1 3.7   CHLORIDE mmol/L 102 103 103   CO2 mmol/L 29 28 27   BUN mg/dL 15 12 18   CREATININE mg/dL 1.03 1.00 1.09   CALCIUM mg/dL 9.5 9.3 9.7     Results from last 7 days   Lab Units 02/09/25  1512   ALK PHOS U/L 97   BILIRUBIN TOTAL mg/dL 0.6   PROTEIN TOTAL g/dL 7.4   ALT U/L 8*   AST U/L 14      Results from last 7 days   Lab Units 02/09/25  1512   APTT seconds 34   INR  1.0      CT angio chest abdomen pelvis   Final Result   1. Stable postsurgical changes of TEVAR with carotid subclavian   bypass for type B aortic dissection.   2. Aneurysmal dilation of the distal descending thoracic aorta   measuring up to 7 cm and stable since at least 09/22/2023. However,   there is aneurysmal dilation of the aorta at the diaphragmatic hiatus   measuring up to 5.7 cm slightly increased from 09/22/2023 when it   measured 5.3 cm.   3. No acute process of the chest, abdomen, or pelvis.   4. Additional stable findings as discussed above.             I personally reviewed the images/study and I agree with the findings   as stated by Molyl Carrera DO, PGY-3. This study was interpreted   at [facility], [place], [place].        MACRO:   None.        Signed by: Jani Stevens 2/9/2025 9:08 PM   Dictation workstation:   [id number]      CT angio neck   Final  Result   *There is continuous opacification with slight narrowing of the   cervical portion of the left vertebral artery. *The right vertebral   artery is normal *The bilateral carotid arteries are normal.        MACRO:   none        Signed by: Pato Smith 2/9/2025 5:38 PM   Dictation workstation:   [id number]      XR chest 1 view   Final Result   1.  No evidence of acute cardiopulmonary process.        I personally reviewed the images/study and I agree with the findings   as stated by Molly Carrera DO, PGY-3. This study was interpreted   at [facility], [place], [place].        MACRO:   None        Signed by: Tanvir Peters 2/9/2025 5:23 PM   Dictation workstation:   [id number]             Assessment/Plan      Sreekanth Greenfield is a 64 y.o. male with PMH of of type B aortic dissection sp TEVAR L carotid to L subclavian PTFE bypass (2017), PTSD, HTN who presented to ED after elevated BP of 320/180 via wrist monitor and admitted to CICU for asymptomatic hypertensive urgency. Patient reports having baseline elevated BP ranging from 180s to over 200 consistently via wrist BP monitor and reports being adherent to BP regimen. In ED, patient refused Hydral but took home Labetalol per patient.  On arrival to CICU, patient with -210s, remained asymptomatic, and declined Hydralazine or any new medications because he is apprehensive of side effects. Improvement in BP to -180s after restarting home antihypertensives.    Updates (02/11/25):  Consulted vascular surgery regarding increased dilation of aorta to 5.7 cm at diaphragmatic hiatus. Recommending SBP< 140.  Continue home regimen clonidine 0.2 mg BID and labetalol 200 mg TID (increased from BID). Pharmacy educated patient regarding additional antihypertensives. Patient still not amenable to adjusting regimen.       NEUROLOGY/ PSYCH:  #Hx PTSD  :: Not taking medications  Continue to monitor        CARDIOVASCULAR:  #Asymptomatic hypertensive urgency  #Type B aortic dissection s/p TEVAR L carotid to L subclavian PTFE bypass (2017)  #Aneurysmal dilation of distal descending thoracic aorta, 7cm and stable   #HTN  :: BP >300 per wrist monitor per pt, in ED SBP 200s s/p 1x PO Labetalol per patient   :: CTA on admission showing stable postsurgical changes of TEVAR with carotid subclavian bypass for type B aortic dissection. Aneurysmal dilation of the distal descending thoracic aorta measuring up to 7 cm and stable since at least 09/22/2023 however dilation of the aorta at the diaphragmatic hiatus measuring up to 5.7 cm slightly increased from 09/22/2023 when it measured 5.3 cm.  :: Patient currently refusing Hydralazine due to concerns about side effects, information provided regarding uses/side effects though patient still hesitant and only requesting home medications   :: s/p 1x Clonidine BP 0.2mg in CICU with reduction in BP 140s-180s  :: Had previously been trialed on carvedilol, patient reports he could not tolerate this   Continue home regimen clonidine 0.2 mg BID and labetalol 200 mg TID (increased from BID).  Consulted vascular surgery regarding increased dilation of aorta to 5.7 cm at diaphragmatic hiatus. Recommending SBP< 140.  Pharmacy educated patient regarding additional antihypertensives. Patient still not amenable to adjusting regimen.       PULMONARY:  HUY        RENAL/ GENITOURINARY:  HUY        GASTROENTEROLOGY:  #Hx of rectal bleeding iso diverticulosis in 12/2022  :: Per chart review last colonoscopies 2011+2012 for rectal bleeding showed colonic diverticula and 2 polyps which were removed, reports unavailable       ENDOCRINOLOGY:  HUY       RHEUMATOLOGY:  HUY        HEMATOLOGY:  HUY        MUSCULOSKELETAL/ SKIN:  HUY        INFECTIOUS DISEASE:  HUY    IVF: PRN  Electrolytes: PRN  Access: PIV    Diet: Adult diet Regular  DVT Ppx: Lovenox  GI Ppx: Not indicated  Bowel regimen: MiraLAX daily  PRN  Pain management: Not indicated    Emergency Contact: Extended Emergency Contact Information  Primary Emergency Contact: Catrachita Greenfield  Mobile Phone: [phone]  Relation: Brother  Preferred language: English   needed? No  Secondary Emergency Contact: Mandi Greenfield  Home Phone: [phone]  Mobile Phone: [phone]  Relation: Sister  Code status: DNR and No Intubation (confirmed on admission)  PT/OT: Not indicated  Disposition: Inpatient pending further workup of aortic dissection    Jose Raul Nunn MD   PGY-1 Internal Medicine

## 2025-02-12 VITALS
TEMPERATURE: 97.7 F | SYSTOLIC BLOOD PRESSURE: 168 MMHG | BODY MASS INDEX: 27.02 KG/M2 | HEART RATE: 73 BPM | OXYGEN SATURATION: 96 % | WEIGHT: 199.52 LBS | HEIGHT: 72 IN | DIASTOLIC BLOOD PRESSURE: 89 MMHG | RESPIRATION RATE: 18 BRPM

## 2025-02-12 PROBLEM — Z98.890 HX OF REPAIR OF DISSECTING THORACIC AORTIC ANEURYSM, STANFORD TYPE B: Status: ACTIVE | Noted: 2025-02-12

## 2025-02-12 PROBLEM — I77.819 DILATION OF DESCENDING AORTA (CMS-HCC): Status: ACTIVE | Noted: 2025-02-12

## 2025-02-12 PROBLEM — Z86.79 HX OF REPAIR OF DISSECTING THORACIC AORTIC ANEURYSM, STANFORD TYPE B: Status: ACTIVE | Noted: 2025-02-12

## 2025-02-12 PROBLEM — F43.10 PTSD (POST-TRAUMATIC STRESS DISORDER): Status: ACTIVE | Noted: 2025-02-12

## 2025-02-12 PROCEDURE — 99239 HOSP IP/OBS DSCHRG MGMT >30: CPT | Performed by: INTERNAL MEDICINE

## 2025-02-12 PROCEDURE — 2500000001 HC RX 250 WO HCPCS SELF ADMINISTERED DRUGS (ALT 637 FOR MEDICARE OP): Performed by: NURSE PRACTITIONER

## 2025-02-12 RX ORDER — LABETALOL 200 MG/1
2 TABLET, FILM COATED ORAL EVERY 8 HOURS
Qty: 180 TABLET | Refills: 3 | Status: SHIPPED | OUTPATIENT
Start: 2025-02-12

## 2025-02-12 RX ORDER — ACETAMINOPHEN 500 MG
1000 TABLET ORAL EVERY 6 HOURS PRN
Qty: 30 TABLET | Refills: 0 | Status: SHIPPED | OUTPATIENT
Start: 2025-02-12

## 2025-02-12 RX ORDER — LABETALOL 200 MG/1
2 TABLET, FILM COATED ORAL EVERY 8 HOURS
Qty: 180 TABLET | Refills: 3 | Status: SHIPPED | OUTPATIENT
Start: 2025-02-12 | End: 2025-02-12

## 2025-02-12 RX ADMIN — LABETALOL HYDROCHLORIDE 200 MG: 200 TABLET, FILM COATED ORAL at 05:02

## 2025-02-12 RX ADMIN — CLONIDINE HYDROCHLORIDE 0.2 MG: 0.1 TABLET ORAL at 00:38

## 2025-02-12 ASSESSMENT — COGNITIVE AND FUNCTIONAL STATUS - GENERAL
MOBILITY SCORE: 24
DAILY ACTIVITIY SCORE: 24

## 2025-02-12 ASSESSMENT — PAIN - FUNCTIONAL ASSESSMENT: PAIN_FUNCTIONAL_ASSESSMENT: 0-10

## 2025-02-12 ASSESSMENT — PAIN SCALES - GENERAL: PAINLEVEL_OUTOF10: 0 - NO PAIN

## 2025-02-12 NOTE — CARE PLAN
The patient's goals for the shift include      The clinical goals for the shift include to remain HDS    Over the shift, the patient did not make progress toward the following goals. Barriers to progression include none. Recommendations to address these barriers include none.

## 2025-02-12 NOTE — CARE PLAN
The patient's goals for the shift include      The clinical goals for the shift include go home    Over the shift, the patient did  make progress toward the following goals. Barriers to progression include none. Recommendations to address these barriers include n/a.  Pt to be discharged with an increase in labetalol . IV's removed and follow up reviewed. Pt provided a lyft

## 2025-02-12 NOTE — DISCHARGE SUMMARY
"Discharge Diagnosis  Primary hypertension    Issues Requiring Follow-Up  HTN management    Test Results Pending At Discharge  Pending Labs       No current pending labs.          Hospital Course  Sreekanth Greenfield is a 64 y.o. male with PMHx of type B aortic dissection sp TEVAR L carotid to L subclavian PTFE bypass (2017), PTSD, HTN who presented to ED after elevated BP of 320/180 via wrist monitor and admitted to CICU for asymptomatic hypertensive urgency.      Patient reports that he was doing his home BP check via BP wrist monitor and was \"over 300\" and came to the ED. He states that he is adherent with home HTN regimen of clonidine 0.2 mg BID and labetalol 200 mg BID. Otherwise asymptomatic but notes that his SBP runs around 180-250 typically.    Vascular surgery consulted given increased aneurysmal dilatation of aorta to 5.7 cm at the diaphragmatic hiatus. Recommended no acute inventions at this time as it appeared stable on their review of imaging. Also recommended follow-up as previously scheduled outpatient with Dr. Chu on 2/26/2025 and blood pressure control to SBP <140.     Blood pressures while in the CICU ranged around the 150s-180s systolic after restarting home medications and increasing labetalol to TID dosing. Patient reports that he has \"bad side effects to medications\" and \"I get all the bad side effects\"; referring to having skin rashes and nausea in the past to several medications including carvedilol. He states he is very hesitant to take any new medications because of side effects and assisting him taking only home blood pressure medications to see if this will improve hypertension. Despite multiple sessions of counseling/education with providers and pharmacists, patient is very resistant to the idea of taking additional antihypertensives while inpatient. He is amenable to starting a new medication as an outpatient. Patient determined to have capacity as charted in significant event note on " 2/10.    Patient transferred to floor/HVI service on 2/11 for further management.    Patient agreeable to increase Labetalol to 400 mg TID and start as outpatient.    Prescription sent to patients preferred pharmacy. Patient denied having any other home going needs.      Upon review of medications, lab values, and vital signs patient was deemed stable for discharge in satisfactory condition.     Discharge weight: 90.5 kg     More than 60 minutes were spent in coordinating patient discharge.      Physical Exam At Time of Discharge  Constitutional:       General: He is not in acute distress.     Appearance: Normal appearance. He is not ill-appearing.   HENT:      Head: Normocephalic and atraumatic.   Cardiovascular:      Rate and Rhythm: Normal rate and regular rhythm.      Pulses: Normal pulses.      Heart sounds: Normal heart sounds. No murmur heard.     No friction rub. No gallop.   Pulmonary:      Effort: Pulmonary effort is normal. No respiratory distress.      Breath sounds: Normal breath sounds. No wheezing.   Abdominal:      General: Abdomen is flat. There is no distension.      Palpations: Abdomen is soft. There is no mass.      Tenderness: There is no abdominal tenderness. There is no guarding or rebound.   Musculoskeletal:         General: No swelling or deformity. Normal range of motion.   Skin:     General: Skin is warm and dry.   Neurological:      General: No focal deficit present.      Mental Status: He is alert and oriented to person, place, and time. Mental status is at baseline.   Psychiatric:         Mood and Affect: Mood normal.         Behavior: Behavior normal.     Home Medications     Medication List      CHANGE how you take these medications     labetalol 200 mg tablet; Commonly known as: Normodyne; Take 2 tablets   (400 mg) by mouth every 8 hours.; What changed: how much to take, when to   take this     CONTINUE taking these medications     acetaminophen 500 mg tablet; Commonly known as:  Tylenol; Take 2 tablets   (1,000 mg) by mouth every 6 hours if needed for mild pain (1 - 3).   cloNIDine 0.2 mg tablet; Commonly known as: Catapres   cyclobenzaprine 10 mg tablet; Commonly known as: Flexeril; Take 1 tablet   (10 mg) by mouth 2 times a day as needed for muscle spasms for up to 5   days.   famotidine 20 mg tablet; Commonly known as: Pepcid; Take 1 tablet (20   mg) by mouth 2 times a day for 15 days.   hydrOXYzine HCL 25 mg tablet; Commonly known as: Atarax; Take 1 tablet   (25 mg) by mouth every 6 hours for 3 days.     ASK your doctor about these medications     lidocaine 5 % patch; Commonly known as: Lidoderm; Place 1 patch over 12   hours on the skin once daily. Remove & discard patch within 12 hours or as   directed by MD.       Outpatient Follow-Up  Future Appointments   Date Time Provider Department Center   2/26/2025  1:20 PM Tee Chu MD PhD [id number]       Shakir West, APRN-CNP

## 2025-02-12 NOTE — DISCHARGE INSTRUCTIONS
Your medications have not changed, we have only increased the doses and/or frequency for better BP control. Please take the medications listed on these instructions as prescribed until you are seen at a follow up appointment.     You were seen by Vascular Surgery given your medical history, they reviewed imaging and found that your aneurysm has remained stable in size and does not require any acute intervention. They recommend your goal SBP to be <140. You have follow-up with vascular surgery scheduled on 2/26 as an outpatient.     Please schedule a follow up appointment with your primary care provider, Dr. Lucy Reyes [phone] at [facility], in 2-3 weeks or first available.     It was a pleasure to have met and care for you!

## 2025-02-24 PROBLEM — K80.20 GALL BLADDER STONES: Status: ACTIVE | Noted: 2025-02-24

## 2025-02-24 PROBLEM — I71.40 ABDOMINAL AORTIC ANEURYSM (AAA) WITHOUT RUPTURE (CMS-HCC): Status: ACTIVE | Noted: 2025-02-24

## 2025-02-24 PROBLEM — K62.5 RECTAL HEMORRHAGE: Status: ACTIVE | Noted: 2025-02-24

## 2025-02-24 PROBLEM — M62.81 MUSCLE WEAKNESS (GENERALIZED): Status: ACTIVE | Noted: 2017-06-15

## 2025-02-24 PROBLEM — E87.6 HYPOKALEMIA: Status: ACTIVE | Noted: 2017-06-14

## 2025-02-24 PROBLEM — R11.2 NAUSEA AND VOMITING: Status: ACTIVE | Noted: 2025-02-24

## 2025-02-24 PROBLEM — I25.10 CORONARY ARTERY DISEASE: Status: ACTIVE | Noted: 2017-10-23

## 2025-02-24 PROBLEM — I65.09 OCCLUSION OF VERTEBRAL ARTERY: Status: ACTIVE | Noted: 2025-02-24

## 2025-02-24 PROBLEM — D50.9 IRON DEFICIENCY ANEMIA: Status: ACTIVE | Noted: 2017-07-06

## 2025-02-24 PROBLEM — Z86.79 HISTORY OF HYPERTENSION: Status: ACTIVE | Noted: 2025-02-24

## 2025-02-24 PROBLEM — K57.92 DIVERTICULITIS: Status: ACTIVE | Noted: 2025-02-24

## 2025-02-24 PROBLEM — E55.9 VITAMIN D DEFICIENCY: Status: ACTIVE | Noted: 2017-07-06

## 2025-02-24 PROBLEM — D62 ACUTE POSTHEMORRHAGIC ANEMIA: Status: ACTIVE | Noted: 2017-06-14

## 2025-02-24 PROBLEM — E66.9 OBESITY, UNSPECIFIED: Status: ACTIVE | Noted: 2017-05-08

## 2025-02-24 PROBLEM — R06.00 DYSPNEA: Status: ACTIVE | Noted: 2025-02-24

## 2025-02-24 PROBLEM — R07.9 CHEST PAIN: Status: ACTIVE | Noted: 2021-11-03

## 2025-02-24 PROBLEM — I11.9 HYPERTENSIVE HEART DISEASE WITHOUT HEART FAILURE: Status: ACTIVE | Noted: 2025-02-24

## 2025-02-24 PROBLEM — I71.03 AORTIC DISSECTION, THORACOABDOMINAL (MULTI): Status: ACTIVE | Noted: 2025-02-24

## 2025-02-24 PROBLEM — H04.129 DRY EYE SYNDROME: Status: ACTIVE | Noted: 2025-02-24

## 2025-02-24 PROBLEM — K59.00 CONSTIPATION, UNSPECIFIED: Status: ACTIVE | Noted: 2017-06-14

## 2025-02-26 ENCOUNTER — APPOINTMENT (OUTPATIENT)
Dept: VASCULAR SURGERY | Facility: HOSPITAL | Age: 65
End: 2025-02-26
Payer: COMMERCIAL

## 2025-03-12 ENCOUNTER — OFFICE VISIT (OUTPATIENT)
Dept: VASCULAR SURGERY | Facility: HOSPITAL | Age: 65
End: 2025-03-12
Payer: COMMERCIAL

## 2025-03-12 VITALS
WEIGHT: 199 LBS | BODY MASS INDEX: 26.95 KG/M2 | HEART RATE: 89 BPM | OXYGEN SATURATION: 96 % | SYSTOLIC BLOOD PRESSURE: 151 MMHG | DIASTOLIC BLOOD PRESSURE: 98 MMHG | HEIGHT: 72 IN

## 2025-03-12 ASSESSMENT — PAIN SCALES - GENERAL: PAINLEVEL_OUTOF10: 0-NO PAIN

## 2025-03-26 ENCOUNTER — APPOINTMENT (OUTPATIENT)
Dept: VASCULAR SURGERY | Facility: HOSPITAL | Age: 65
End: 2025-03-26
Payer: COMMERCIAL

## 2025-03-26 PROBLEM — K92.9 DIGESTIVE SYSTEM DISORDER: Status: ACTIVE | Noted: 2017-05-08

## 2025-03-26 PROBLEM — N39.0 URINARY TRACT INFECTIOUS DISEASE: Status: ACTIVE | Noted: 2017-05-08

## 2025-03-26 PROBLEM — Z98.890 STATUS POST VASCULAR SURGERY: Status: ACTIVE | Noted: 2017-05-08

## 2025-05-06 ENCOUNTER — HOSPITAL ENCOUNTER (EMERGENCY)
Facility: HOSPITAL | Age: 65
Discharge: HOME | End: 2025-05-06
Attending: EMERGENCY MEDICINE
Payer: COMMERCIAL

## 2025-05-06 VITALS
HEIGHT: 72 IN | WEIGHT: 210 LBS | DIASTOLIC BLOOD PRESSURE: 117 MMHG | HEART RATE: 80 BPM | TEMPERATURE: 98.2 F | BODY MASS INDEX: 28.44 KG/M2 | RESPIRATION RATE: 21 BRPM | SYSTOLIC BLOOD PRESSURE: 174 MMHG | OXYGEN SATURATION: 99 %

## 2025-05-06 DIAGNOSIS — I1A.0 RESISTANT HYPERTENSION: Primary | ICD-10-CM

## 2025-05-06 DIAGNOSIS — I10 PRIMARY HYPERTENSION: ICD-10-CM

## 2025-05-06 PROCEDURE — 93010 ELECTROCARDIOGRAM REPORT: CPT

## 2025-05-06 PROCEDURE — 99284 EMERGENCY DEPT VISIT MOD MDM: CPT | Performed by: EMERGENCY MEDICINE

## 2025-05-06 PROCEDURE — 99283 EMERGENCY DEPT VISIT LOW MDM: CPT | Performed by: EMERGENCY MEDICINE

## 2025-05-06 RX ORDER — LABETALOL 200 MG/1
2 TABLET, FILM COATED ORAL EVERY 8 HOURS
Qty: 180 TABLET | Refills: 0 | Status: SHIPPED | OUTPATIENT
Start: 2025-05-06

## 2025-05-06 ASSESSMENT — COLUMBIA-SUICIDE SEVERITY RATING SCALE - C-SSRS
1. IN THE PAST MONTH, HAVE YOU WISHED YOU WERE DEAD OR WISHED YOU COULD GO TO SLEEP AND NOT WAKE UP?: NO
2. HAVE YOU ACTUALLY HAD ANY THOUGHTS OF KILLING YOURSELF?: NO
6. HAVE YOU EVER DONE ANYTHING, STARTED TO DO ANYTHING, OR PREPARED TO DO ANYTHING TO END YOUR LIFE?: NO

## 2025-05-06 ASSESSMENT — PAIN SCALES - GENERAL: PAINLEVEL_OUTOF10: 0 - NO PAIN

## 2025-05-06 ASSESSMENT — PAIN - FUNCTIONAL ASSESSMENT: PAIN_FUNCTIONAL_ASSESSMENT: 0-10

## 2025-05-06 NOTE — ED TRIAGE NOTES
BIB EMS for hypertension with systolic BP in 250s. Pt has a HX of dissection approx. 7-10 years ago according to EMS.

## 2025-05-06 NOTE — DISCHARGE INSTRUCTIONS
We would make sure you are continuing to do the dietary recommendations from your cardiologist including low-salt diet.  Please continue taking your clonidine 0.2 mg twice a day.  There seems to be some confusion about your labetalol dose.  We would recommend 2 tablets, 400 mg 3 times a day as you are recently prescribed.  Please follow-up closely with your cardiologist for consideration of dose adjustment but we do not feel strongly as you are currently taking a lesser dose of labetalol than previously prescribed.
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
## 2025-05-07 LAB
ATRIAL RATE: 88 BPM
P AXIS: 61 DEGREES
P OFFSET: 184 MS
P ONSET: 118 MS
PR INTERVAL: 214 MS
Q ONSET: 225 MS
QRS COUNT: 15 BEATS
QRS DURATION: 94 MS
QT INTERVAL: 346 MS
QTC CALCULATION(BAZETT): 418 MS
QTC FREDERICIA: 393 MS
R AXIS: 41 DEGREES
T AXIS: 36 DEGREES
T OFFSET: 398 MS
VENTRICULAR RATE: 88 BPM